# Patient Record
Sex: MALE | Employment: FULL TIME | URBAN - METROPOLITAN AREA
[De-identification: names, ages, dates, MRNs, and addresses within clinical notes are randomized per-mention and may not be internally consistent; named-entity substitution may affect disease eponyms.]

---

## 2018-07-28 ENCOUNTER — TRANSFERRED RECORDS (OUTPATIENT)
Dept: HEALTH INFORMATION MANAGEMENT | Facility: CLINIC | Age: 38
End: 2018-07-28

## 2018-08-23 ENCOUNTER — OFFICE VISIT (OUTPATIENT)
Dept: FAMILY MEDICINE | Facility: CLINIC | Age: 38
End: 2018-08-23
Payer: COMMERCIAL

## 2018-08-23 VITALS
HEART RATE: 63 BPM | BODY MASS INDEX: 26.9 KG/M2 | SYSTOLIC BLOOD PRESSURE: 113 MMHG | HEIGHT: 73 IN | WEIGHT: 203 LBS | DIASTOLIC BLOOD PRESSURE: 71 MMHG | TEMPERATURE: 98.6 F | OXYGEN SATURATION: 94 %

## 2018-08-23 DIAGNOSIS — K66.8 PERITONEAL CYST: ICD-10-CM

## 2018-08-23 DIAGNOSIS — K42.9 UMBILICAL HERNIA WITHOUT OBSTRUCTION AND WITHOUT GANGRENE: Primary | ICD-10-CM

## 2018-08-23 PROCEDURE — 99214 OFFICE O/P EST MOD 30 MIN: CPT | Performed by: INTERNAL MEDICINE

## 2018-08-23 NOTE — PROGRESS NOTES
SUBJECTIVE:   Marcos Birmingham is a 37 year old male who presents to clinic today for the following health issues:      New Patient/Transfer of Care    ED/UC Followup:    Facility:  [facility]    Date of visit: 07/28/2018  Reason for visit: Abdominal pain, right lower quadrant;Abnormal computed tomography of pelvis  Current Status: patient state that he is doing better       HPI:   Patient Marcos Birmingham is a very pleasant 37 year old male with history of umbilical hernia who presents to Internal Medicine clinic today for post ER discharge follow up after recent ER visit at [facility]. Regarding the patient's recent ER visit, the patient was diagnosed with an incidental finding of peritoneal cyst in addition to his umbilical hernia. Patient is interested in an evaluation by general surgery clinic going forward both the umbilical hernia and peritoneal cyst findings on the CT scan obtained at the ER. the patient denies any chest pain, headaches, fever or chills.        Current Medications:     No current outpatient prescriptions on file.         Allergies:      Allergies   Allergen Reactions     No Known Allergies             Past Medical History:   No past medical history on file.      Past Surgical History:   No past surgical history on file.      Family Medical History:   No family history on file.      Social History:     Social History     Social History     Marital status:      Spouse name: N/A     Number of children: N/A     Years of education: N/A     Occupational History     Not on file.     Social History Main Topics     Smoking status: Never Smoker     Smokeless tobacco: Never Used     Alcohol use Yes      Comment: 3 drinks aweek     Drug use: No     Sexual activity: Yes     Partners: Female     Other Topics Concern     Not on file     Social History Narrative     No narrative on file           Review of System:     Constitutional: Negative for fever or chills  Skin:  "Negative for rashes  Ears/Nose/Throat: Negative for nasal congestion, sore throat  Respiratory: No shortness of breath, dyspnea on exertion, cough, or hemoptysis  Cardiovascular: Negative for chest pain  Gastrointestinal: Negative for nausea, vomiting, positive for recent diagnosis of both umbilical hernia and peritoneal cyst on CT  Genitourinary: Negative for dysuria, hematuria  Musculoskeletal: Negative for myalgias  Neurologic: Negative for headaches  Psychiatric: Negative for depression, anxiety  Hematologic/Lymphatic/Immunologic: Negative  Endocrine: Negative  Behavioral: Negative for tobacco use       Physical Exam:   /71 (BP Location: Left arm, Cuff Size: Adult Large)  Pulse 63  Temp 98.6  F (37  C) (Oral)  Ht 6' 1\" (1.854 m)  Wt 203 lb (92.1 kg)  SpO2 94%  BMI 26.78 kg/m2    GENERAL: alert and no distress  EYES: eyes grossly normal to inspection, and conjunctivae and sclerae normal  HENT: Normocephalic atraumatic. Nose and mouth without ulcers or lesions  NECK: supple  RESP: lungs clear to auscultation   CV: regular rate and rhythm, normal S1 S2  LYMPH: no peripheral edema   ABDOMEN: non-distended, umbilical hernia present without signs of acute bowel obstruction  MS: no gross musculoskeletal defects noted  SKIN: no suspicious lesions or rashes  NEURO: Alert & Oriented x 3.   PSYCH: mentation appears normal, affect normal        Diagnostic Test Results:   Outside CT Abd Pelvis with IV contrast obtained on 7/28/2018 at [facility] reviewed today shows:    IMPRESSION:  1. No acute type findings confirmed.  2. 2.5 cm cyst within the left pelvis is nonspecific, may reflect peritoneal inclusion type cyst. Recommend follow-up in 6-12 months to confirm stability.  3. Small fat-containing umbilical hernia.    Significant findings requiring follow-up. Recommend follow-up CT abdomen and pelvis and 6-12 months.   Result Narrative   COMPARISON:  None.    TECHNIQUE:  Images were obtained through the " abdomen and pelvis following the administration of  100 mL IOPAMIDOL 61 % IV SOLN contrast.    FINDINGS: Lung bases clear other than some mild dependent atelectatic change. No effusion. Cholecystectomy clips. Liver, spleen, adrenal glands, pancreas, and kidneys are unremarkable. No abdominal lymphadenopathy or ascites. Scattered small mesenteric lymph nodes including a few in the right lower quadrant, nonspecific. Small fat-containing umbilical hernia.    No evidence of bowel obstruction. No bowel wall thickening or inflammatory change confirmed. No evidence of appendicitis with normal appendix noted for instance series 2 slices 69 through 72 and corresponding coronal series 5 slices 23 through 30.    No free fluid or lymphadenopathy within the pelvis. Bladder is unremarkable. Within the left mid pelvis, immediately medial to the left external iliac vein there is an ovoid 2.5 x 2.0 x 2.0 cm fluid attenuation collection (e.g. series 2 slice 67). No adjacent inflammatory change. Appears separate from the adjacent sigmoid colon.    No suspicious osseous findings.         ASSESSMENT/PLAN:       (K42.9) Umbilical hernia without obstruction and without gangrene  (primary encounter diagnosis)  Comment: chronic umbilical hernia without signs of bowel obstruction   Plan: I have ordered GENERAL SURG ADULT REFERRAL with the [facility] surgery clinic for further evaluation and management going forward.        (K66.8) Peritoneal cyst  Comment: incidental finding of peritoneal cyst on CT scan of unclear clinical significance  Plan: I have ordered GENERAL SURG ADULT REFERRAL with the [facility] surgery [facility] for further evaluation and management going forward.     Follow Up Plan:     Patient is instructed to return to Internal Medicine clinic for follow-up visit in 1 month.        Nuvia Moore MD  Internal Medicine  [facility]

## 2018-08-23 NOTE — MR AVS SNAPSHOT
After Visit Summary   8/23/2018    Marcos Birmingham    MRN: [id number]           Patient Information     Date Of Birth          1980        Visit Information        Provider Department      8/23/2018 11:40 AM Nuvia Moore MD [facility]        Today's Diagnoses     Umbilical hernia without obstruction and without gangrene    -  1    Peritoneal cyst           Follow-ups after your visit        Additional Services     GENERAL SURG ADULT REFERRAL       Your provider has referred you to: [facility]: [facility] [phone]   [email or web address]    Please be aware that coverage of these services is subject to the terms and limitations of your health insurance plan.  Call member services at your health plan with any benefit or coverage questions.      Please bring the following with you to your appointment:    (1) Any X-Rays, CTs or MRIs which have been performed.  Contact the facility where they were done to arrange for  prior to your scheduled appointment.   (2) List of current medications   (3) This referral request   (4) Any documents/labs given to you for this referral                  Your next 10 appointments already scheduled     Aug 29, 2018 10:15 AM CDT   CONSULT with Kranthi Felton MD   [facility]    [address]  [place] [address] [phone]              Who to contact     If you have questions or need follow up information about today's clinic visit or your schedule please contact [facility] directly at [phone].  Normal or non-critical lab and imaging results will be communicated to you by MyChart, letter or phone within 4 business days after the clinic has received the results. If you do not hear from us within 7 days, please contact the clinic through MyChart or phone. If you have a critical or abnormal lab result, we  "will notify you by phone as soon as possible.  Submit refill requests through [facility] or call your pharmacy and they will forward the refill request to us. Please allow 3 business days for your refill to be completed.          Additional Information About Your Visit        OrderUphart Information     [facility] lets you send messages to your doctor, view your test results, renew your prescriptions, schedule appointments and more. To sign up, go to [email or web address] . Click on \"Log in\" on the left side of the screen, which will take you to the Welcome page. Then click on \"Sign up Now\" on the right side of the page.     You will be asked to enter the access code listed below, as well as some personal information. Please follow the directions to create your username and password.     Your access code is: [email or web address]  Expires: 10/31/2018  3:21 PM     Your access code will  in 90 days. If you need help or a new code, please call your [place] clinic or [phone].        Care EveryWhere ID     This is your Care EveryWhere ID. This could be used by other organizations to access your [place] medical records  [id number]        Your Vitals Were     Pulse Temperature Height Pulse Oximetry BMI (Body Mass Index)       63 98.6  F (37  C) (Oral) 6' 1\" (1.854 m) 94% 26.78 kg/m2        Blood Pressure from Last 3 Encounters:   18 113/71    Weight from Last 3 Encounters:   18 203 lb (92.1 kg)              We Performed the Following     GENERAL SURG ADULT REFERRAL        Primary Care Provider Office Phone # Fax #    Nuvia Eliot Moore [phone] [address]  [place] [address]        Equal Access to Services     YUDITH KILLIAN : Molly Bennett, amber corona, gaston ventura, annika patrick. So [facility] [phone].    ATENCIÓN: Si habla español, tiene a stewart disposición servicios gratuitos de asistencia lingüística. Llame al " [phone].    We comply with applicable federal civil rights laws and [place] laws. We do not discriminate on the basis of race, color, national origin, age, disability, sex, sexual orientation, or gender identity.            Thank you!     Thank you for choosing [facility]  for your care. Our goal is always to provide you with excellent care. Hearing back from our patients is one way we can continue to improve our services. Please take a few minutes to complete the written survey that you may receive in the mail after your visit with us. Thank you!             Your Updated Medication List - Protect others around you: Learn how to safely use, store and throw away your medicines at www.disposemymeds.org.      Notice  As of 8/23/2018  1:12 PM    You have not been prescribed any medications.

## 2018-09-06 ENCOUNTER — OFFICE VISIT (OUTPATIENT)
Dept: SURGERY | Facility: CLINIC | Age: 38
End: 2018-09-06
Payer: COMMERCIAL

## 2018-09-06 ENCOUNTER — TELEPHONE (OUTPATIENT)
Dept: SURGERY | Facility: CLINIC | Age: 38
End: 2018-09-06

## 2018-09-06 VITALS
HEART RATE: 60 BPM | DIASTOLIC BLOOD PRESSURE: 76 MMHG | BODY MASS INDEX: 26.9 KG/M2 | WEIGHT: 203 LBS | SYSTOLIC BLOOD PRESSURE: 110 MMHG | HEIGHT: 73 IN

## 2018-09-06 DIAGNOSIS — K43.2 INCISIONAL HERNIA, WITHOUT OBSTRUCTION OR GANGRENE: Primary | ICD-10-CM

## 2018-09-06 PROCEDURE — 99204 OFFICE O/P NEW MOD 45 MIN: CPT | Performed by: SURGERY

## 2018-09-06 NOTE — LETTER
"2018    RE: Marcos Law, : 1980      [facility]  Surgery Consultation     HPI: Patient is a 38 year old male who is here for consultation requested by Nuvia Moore [phone] for evaluation of abdominal pain. The patient reports having bilateral lower abdominal pain for some time. He had a worsening episode and underwent a CT scan at [facility]. The CT showed a probable incisional hernia as well as a 2.5 cm peritoneal inclusion cyst in the left pelvis. No other abnormalities were found to account for his pain. He reports that his symptoms have continued and have worsened. He states the pain is worse with activity and relieved with rest. He has daily bowel movements and denies any complaints or changes in the pain after bowel movements. He tolerates a regular diet without any nausea. He states the umbilical hernia is getting larger and more tender. He denies any signs and symptoms of obstruction. Patient denies fevers, chills, nausea, vomiting, SOB, chest pain, abdominal pain..     PMH:   has a past medical history of Testicular torsion.  PSH:    has a past surgical history that includes Cholecystectomy and Eye surgery.  Social History:   reports that he has never smoked. He has never used smokeless tobacco. He reports that he drinks alcohol. He reports that he does not use illicit drugs.  Family History:   family history is not on file.  Medications/Allergies: Home medications and allergies reviewed.     ROS:  The 10 point Review of Systems is negative other than noted in the HPI.     Physical Exam:  /76  Pulse 60  Ht 6' 1\" (1.854 m)  Wt 203 lb (92.1 kg)  BMI 26.78 kg/m2  GENERAL: Generally appears well.  Psych: Alert and Oriented.  Normal affect  Eyes: Sclera clear  Respiratory:  Lungs clear to ausculation bilaterally with good air excursion  Cardiovascular:  Regular Rate and Rhythm with no murmurs gallops or rubs, normal peripheral pulses  GI: " Abdomen Soft mild tenderness in lower quadrants. Incisional hernia seen and partially reducible. No tenderness. No skin changes.  Groin- I examined the patient in both the standing and supine positions. Right Groin- Moderate sized inguinal hernia.  Hernia was easily reduciable. Left Groin- No hernia Palpated. No scrotal or testicle abnormalities.  Lymphatic/Hematologic/Immune:  No femoral or cervical lymphadenopathy.  Integumentary:  No rashes  Neurological: grossly intact      All new lab and imaging data was reviewed.      Impression and Plan:  Patient is a 38 year old male with abdominal pain     PLAN:   I discussed the pathophysiology of hernias and options for repair including laparoscopic VS open. I have also personally reviewed his CT that shows a incisional hernia and peritoneal inclusion cyst. He was also found to have a right inguinal hernia on examination today. I'm unsure of the etiology of his current abdominal complaints but it could be related to any of the above. The patient would like to go forward with operative intervention. I would recommend going forward with laparoscopic inguinal hernia repair, exploratory laparoscopy and peritoneal cyst removal, and open umbilical hernia with mesh. I advised him that he may not have complete symptomatic relief after surgery and that he would require further workup and management if he had ongoing issues. He understood.The risks associated with the procedure including, but not limited to, recurrence, nerve entrapment or injury, persistence of pain, injury to the bowel/bladder, infertility, hematoma, mesh migration, mesh infection, MI, and PE were discussed with the patient. He indicated understanding of the discussion, asked appropriate questions, and provided consent. Signs and symptoms of incarceration were discussed. If these develop in the interim, he promises to call or go straight to the ER. I have provided the patient with an information  pamphlet     Thank you very much for this consult.     Eliot Cobian M.D.  [facility]  [phone]

## 2018-09-06 NOTE — PROGRESS NOTES
"[facility]  Surgery Consultation    HPI: Patient is a 38 year old male who is here for consultation requested by Nuvia Moore [phone] for evaluation of abdominal pain. The patient reports having bilateral lower abdominal pain for some time. He had a worsening episode and underwent a CT scan at [facility]. The CT showed a probable incisional hernia as well as a 2.5 cm peritoneal inclusion cyst in the left pelvis. No other abnormalities were found to account for his pain. He reports that his symptoms have continued and have worsened. He states the pain is worse with activity and relieved with rest. He has daily bowel movements and denies any complaints or changes in the pain after bowel movements. He tolerates a regular diet without any nausea. He states the umbilical hernia is getting larger and more tender. He denies any signs and symptoms of obstruction. Patient denies fevers, chills, nausea, vomiting, SOB, chest pain, abdominal pain..    PMH:   has a past medical history of Testicular torsion.  PSH:    has a past surgical history that includes Cholecystectomy and Eye surgery.  Social History:   reports that he has never smoked. He has never used smokeless tobacco. He reports that he drinks alcohol. He reports that he does not use illicit drugs.  Family History:   family history is not on file.  Medications/Allergies: Home medications and allergies reviewed.    ROS:  The 10 point Review of Systems is negative other than noted in the HPI.    Physical Exam:  /76  Pulse 60  Ht 6' 1\" (1.854 m)  Wt 203 lb (92.1 kg)  BMI 26.78 kg/m2  GENERAL: Generally appears well.  Psych: Alert and Oriented.  Normal affect  Eyes: Sclera clear  Respiratory:  Lungs clear to ausculation bilaterally with good air excursion  Cardiovascular:  Regular Rate and Rhythm with no murmurs gallops or rubs, normal peripheral pulses  GI: Abdomen Soft mild tenderness in lower quadrants. Incisional hernia " seen and partially reducible. No tenderness. No skin changes.  Groin- I examined the patient in both the standing and supine positions. Right Groin- Moderate sized inguinal hernia.  Hernia was easily reduciable. Left Groin- No hernia Palpated. No scrotal or testicle abnormalities.  Lymphatic/Hematologic/Immune:  No femoral or cervical lymphadenopathy.  Integumentary:  No rashes  Neurological: grossly intact     All new lab and imaging data was reviewed.     Impression and Plan:  Patient is a 38 year old male with abdominal pain    PLAN:   I discussed the pathophysiology of hernias and options for repair including laparoscopic VS open. I have also personally reviewed his CT that shows a incisional hernia and peritoneal inclusion cyst. He was also found to have a right inguinal hernia on examination today. I'm unsure of the etiology of his current abdominal complaints but it could be related to any of the above. The patient would like to go forward with operative intervention. I would recommend going forward with laparoscopic inguinal hernia repair, exploratory laparoscopy and peritoneal cyst removal, and open umbilical hernia with mesh. I advised him that he may not have complete symptomatic relief after surgery and that he would require further workup and management if he had ongoing issues. He understood.The risks associated with the procedure including, but not limited to, recurrence, nerve entrapment or injury, persistence of pain, injury to the bowel/bladder, infertility, hematoma, mesh migration, mesh infection, MI, and PE were discussed with the patient. He indicated understanding of the discussion, asked appropriate questions, and provided consent. Signs and symptoms of incarceration were discussed. If these develop in the interim, he promises to call or go straight to the ER. I have provided the patient with an information pamphlet    Thank you very much for this consult.    Eliot Caraballo  [facility]  [phone]    Please route or send letter to:  Primary Care Provider (PCP) and Referring Provider

## 2018-09-06 NOTE — MR AVS SNAPSHOT
"              After Visit Summary   9/6/2018    Marcos Birmingham    MRN: [id number]           Patient Information     Date Of Birth          1980        Visit Information        Provider Department      9/6/2018 9:15 AM Eliot Cobian MD [facility] General Surgery      Care Instructions    Your surgery is scheduled for 9/17/18 8:15 am at [facility]          Follow-ups after your visit        Your next 10 appointments already scheduled     Sep 17, 2018   Procedure with Eliot Cobian MD   [facility] (--)    [address], [address]  [place] [address] [phone]              Who to contact     If you have questions or need follow up information about today's clinic visit or your schedule please contact [facility] directly at [phone].  Normal or non-critical lab and imaging results will be communicated to you by MyChart, letter or phone within 4 business days after the clinic has received the results. If you do not hear from us within 7 days, please contact the clinic through [facility] or phone. If you have a critical or abnormal lab result, we will notify you by phone as soon as possible.  Submit refill requests through [employer] or call your pharmacy and they will forward the refill request to us. Please allow 3 business days for your refill to be completed.          Additional Information About Your Visit        MyChart Information     [employer] lets you send messages to your doctor, view your test results, renew your prescriptions, schedule appointments and more. To sign up, go to [email or web address] . Click on \"Log in\" on the left side of the screen, which will take you to the Welcome page. Then click on \"Sign up Now\" on the right side of the page.     You will be asked to enter the access code listed below, as well as some personal information. Please follow the directions to " "create your username and password.     Your access code is: [email or web address]  Expires: 10/31/2018  3:21 PM     Your access code will  in 90 days. If you need help or a new code, please call your [place] clinic or [phone].        Care EveryWhere ID     This is your Care EveryWhere ID. This could be used by other organizations to access your [place] medical records  [id number]        Your Vitals Were     Pulse Height BMI (Body Mass Index)             60 6' 1\" (1.854 m) 26.78 kg/m2          Blood Pressure from Last 3 Encounters:   18 110/76   18 113/71    Weight from Last 3 Encounters:   18 203 lb (92.1 kg)   18 203 lb (92.1 kg)              Today, you had the following     No orders found for display       Primary Care Provider Office Phone # Fax #    Nuvia Eliot Moore [phone] [address]  [place] [facility] [address]        Equal Access to Services     [facility]: Hadii aad ku hadasho Soisauroali, waaxda luqadaha, qaybta kaalmada adealbert, annika finley . So [facility] [phone].    ATENCIÓN: Si habla español, tiene a stewart disposición servicios gratuitos de asistencia lingüística. Wilbert al [phone].    We comply with applicable federal civil rights laws and [place] laws. We do not discriminate on the basis of race, color, national origin, age, disability, sex, sexual orientation, or gender identity.            Thank you!     Thank you for choosing [facility]  for your care. Our goal is always to provide you with excellent care. Hearing back from our patients is one way we can continue to improve our services. Please take a few minutes to complete the written survey that you may receive in the mail after your visit with us. Thank you!             Your Updated Medication List - Protect others around you: Learn how to safely use, store and throw away your medicines at www.disposemymeds.org.      Notice  As of " 9/6/2018  9:28 AM    You have not been prescribed any medications.

## 2018-09-06 NOTE — TELEPHONE ENCOUNTER
Type of surgery: laparoscopic bilateral inguinal hernia repair, exploratory laparoscopy peritoneal cyst removal, umbilical hernia repair  Location of surgery: [facility]  Date and time of surgery: 09/17/18 8:15 am  Surgeon: Dr Cobian  Pre-Op Appt Date: pt to schedule  Post-Op Appt Date: pt to schedule   Packet sent out: Yes  Pre-cert/Authorization completed:  na  Date: 09/06/29    General anesthesia

## 2018-09-12 NOTE — H&P (VIEW-ONLY)
SUBJECTIVE:   Marcos Birmingham is a 37 year old male who presents to clinic today for the following health issues:      New Patient/Transfer of Care    ED/UC Followup:    Facility:  [facility]    Date of visit: 07/28/2018  Reason for visit: Abdominal pain, right lower quadrant;Abnormal computed tomography of pelvis  Current Status: patient state that he is doing better       HPI:   Patient Marcos Birmingham is a very pleasant 37 year old male with history of umbilical hernia who presents to Internal Medicine clinic today for post ER discharge follow up after recent ER visit at [facility]. Regarding the patient's recent ER visit, the patient was diagnosed with an incidental finding of peritoneal cyst in addition to his umbilical hernia. Patient is interested in an evaluation by general surgery clinic going forward both the umbilical hernia and peritoneal cyst findings on the CT scan obtained at the ER. the patient denies any chest pain, headaches, fever or chills.        Current Medications:     No current outpatient prescriptions on file.         Allergies:      Allergies   Allergen Reactions     No Known Allergies             Past Medical History:   No past medical history on file.      Past Surgical History:   No past surgical history on file.      Family Medical History:   No family history on file.      Social History:     Social History     Social History     Marital status:      Spouse name: N/A     Number of children: N/A     Years of education: N/A     Occupational History     Not on file.     Social History Main Topics     Smoking status: Never Smoker     Smokeless tobacco: Never Used     Alcohol use Yes      Comment: 3 drinks aweek     Drug use: No     Sexual activity: Yes     Partners: Female     Other Topics Concern     Not on file     Social History Narrative     No narrative on file           Review of System:     Constitutional: Negative for fever or chills  Skin:  "Negative for rashes  Ears/Nose/Throat: Negative for nasal congestion, sore throat  Respiratory: No shortness of breath, dyspnea on exertion, cough, or hemoptysis  Cardiovascular: Negative for chest pain  Gastrointestinal: Negative for nausea, vomiting, positive for recent diagnosis of both umbilical hernia and peritoneal cyst on CT  Genitourinary: Negative for dysuria, hematuria  Musculoskeletal: Negative for myalgias  Neurologic: Negative for headaches  Psychiatric: Negative for depression, anxiety  Hematologic/Lymphatic/Immunologic: Negative  Endocrine: Negative  Behavioral: Negative for tobacco use       Physical Exam:   /71 (BP Location: Left arm, Cuff Size: Adult Large)  Pulse 63  Temp 98.6  F (37  C) (Oral)  Ht 6' 1\" (1.854 m)  Wt 203 lb (92.1 kg)  SpO2 94%  BMI 26.78 kg/m2    GENERAL: alert and no distress  EYES: eyes grossly normal to inspection, and conjunctivae and sclerae normal  HENT: Normocephalic atraumatic. Nose and mouth without ulcers or lesions  NECK: supple  RESP: lungs clear to auscultation   CV: regular rate and rhythm, normal S1 S2  LYMPH: no peripheral edema   ABDOMEN: non-distended, umbilical hernia present without signs of acute bowel obstruction  MS: no gross musculoskeletal defects noted  SKIN: no suspicious lesions or rashes  NEURO: Alert & Oriented x 3.   PSYCH: mentation appears normal, affect normal        Diagnostic Test Results:   Outside CT Abd Pelvis with IV contrast obtained on 7/28/2018 at [facility] reviewed today shows:    IMPRESSION:  1. No acute type findings confirmed.  2. 2.5 cm cyst within the left pelvis is nonspecific, may reflect peritoneal inclusion type cyst. Recommend follow-up in 6-12 months to confirm stability.  3. Small fat-containing umbilical hernia.    Significant findings requiring follow-up. Recommend follow-up CT abdomen and pelvis and 6-12 months.   Result Narrative   COMPARISON:  None.    TECHNIQUE:  Images were obtained through the " abdomen and pelvis following the administration of  100 mL IOPAMIDOL 61 % IV SOLN contrast.    FINDINGS: Lung bases clear other than some mild dependent atelectatic change. No effusion. Cholecystectomy clips. Liver, spleen, adrenal glands, pancreas, and kidneys are unremarkable. No abdominal lymphadenopathy or ascites. Scattered small mesenteric lymph nodes including a few in the right lower quadrant, nonspecific. Small fat-containing umbilical hernia.    No evidence of bowel obstruction. No bowel wall thickening or inflammatory change confirmed. No evidence of appendicitis with normal appendix noted for instance series 2 slices 69 through 72 and corresponding coronal series 5 slices 23 through 30.    No free fluid or lymphadenopathy within the pelvis. Bladder is unremarkable. Within the left mid pelvis, immediately medial to the left external iliac vein there is an ovoid 2.5 x 2.0 x 2.0 cm fluid attenuation collection (e.g. series 2 slice 67). No adjacent inflammatory change. Appears separate from the adjacent sigmoid colon.    No suspicious osseous findings.         ASSESSMENT/PLAN:       (K42.9) Umbilical hernia without obstruction and without gangrene  (primary encounter diagnosis)  Comment: chronic umbilical hernia without signs of bowel obstruction   Plan: I have ordered GENERAL SURG ADULT REFERRAL with the [facility] surgery clinic for further evaluation and management going forward.        (K66.8) Peritoneal cyst  Comment: incidental finding of peritoneal cyst on CT scan of unclear clinical significance  Plan: I have ordered GENERAL SURG ADULT REFERRAL with the [facility] surgery [facility] for further evaluation and management going forward.     Follow Up Plan:     Patient is instructed to return to Internal Medicine clinic for follow-up visit in 1 month.        Nuvia Moore MD  Internal Medicine  [facility]

## 2018-09-13 ENCOUNTER — OFFICE VISIT (OUTPATIENT)
Dept: FAMILY MEDICINE | Facility: CLINIC | Age: 38
End: 2018-09-13
Payer: COMMERCIAL

## 2018-09-13 VITALS
HEIGHT: 73 IN | OXYGEN SATURATION: 96 % | TEMPERATURE: 98.3 F | DIASTOLIC BLOOD PRESSURE: 74 MMHG | HEART RATE: 56 BPM | WEIGHT: 209 LBS | BODY MASS INDEX: 27.7 KG/M2 | SYSTOLIC BLOOD PRESSURE: 114 MMHG

## 2018-09-13 DIAGNOSIS — K66.8 PERITONEAL CYST: ICD-10-CM

## 2018-09-13 DIAGNOSIS — K40.21 BILATERAL RECURRENT INGUINAL HERNIA WITHOUT OBSTRUCTION OR GANGRENE: ICD-10-CM

## 2018-09-13 DIAGNOSIS — Z01.818 PREOP GENERAL PHYSICAL EXAM: Primary | ICD-10-CM

## 2018-09-13 PROCEDURE — 93000 ELECTROCARDIOGRAM COMPLETE: CPT | Performed by: INTERNAL MEDICINE

## 2018-09-13 PROCEDURE — 99214 OFFICE O/P EST MOD 30 MIN: CPT | Performed by: INTERNAL MEDICINE

## 2018-09-13 NOTE — PROGRESS NOTES
[address] [address]  [phone]  Dept: [phone]    PRE-OP EVALUATION:  Today's date: 2018    Marcos Birmingham (: 1980) presents for pre-operative evaluation assessment as requested by Dr. Cobian.  He requires evaluation and anesthesia risk assessment prior to undergoing surgery/procedure for treatment of bilateral inguinal hernia and peritoneal cyst.    Proposed Surgery/ Procedure: Laparoscopic bilateral inguinal hernia repairs with mesh, exploratory laparoscopy for partial peritoneal cyst removal  Date of Surgery/ Procedure: 18  Time of Surgery/ Procedure: 08/15  Hospital/Surgical Facility: [facility]  Fax number for surgical facility:   Primary Physician: Nuvia Moore  Type of Anesthesia Anticipated: General    Patient has a Health Care Directive or Living Will:  NO    1. NO - Do you have a history of heart attack, stroke, stent, bypass or surgery on an artery in the head, neck, heart or legs?  2  NO - DO YOU EVER HAVE ANY PAIN OR DISCOMFORT IN YOUR CHEST?   3. NO - Do you have a history of  Heart Failure?  4. NO - Are you troubled by shortness of breath when: walking on the level, up a slight hill or at night?  5. NO - Do you currently have a cold, bronchitis or other respiratory infection?  6. NO - Do you have a cough, shortness of breath or wheezing?  7. NO - Do you sometimes get pains in the calves of your legs when you walk?  8. NO - Do you or anyone in your family have previous history of blood clots?  9. NO - Do you or does anyone in your family have a serious bleeding problem such as prolonged bleeding following surgeries or cuts?  10. NO - Have you ever had problems with anemia or been told to take iron pills?  11. NO - Have you had any abnormal blood loss such as black, tarry or bloody stools, or abnormal vaginal bleeding?  12. NO - Have you ever had a blood transfusion?  13. NO - Have you or any of your relatives ever had  "problems with anesthesia?  14. NO - Do you have sleep apnea, excessive snoring or daytime drowsiness?  15. NO - Do you have any prosthetic heart valves?  16. NO - Do you have prosthetic joints?  17. NO - Is there any chance that you may be pregnant?      HPI:     HPI related to upcoming procedure: Patient Marcos Birmingham is a very pleasant 38 year old male with inguinal hernia who presents to internal medicine clinic today for a pre op cardiac evlaution for upcoming LAPAROSCOPIC BILATERAL INGUINAL HERNIA REPAIRS WITH MESH, EXPLORATORY LAPAROSCOPY FOR PARTIAL PERITONEAL CYST REMOVAL for treatment of bilateral inguinal hernia and partial peritoneal cyst removal. Patient denies any chest pain, headaches, fever or chills. Patient does not take any daily aspirin or any other blood thinner medications. Patient denies any allergies to anesthesia agents.      MEDICAL HISTORY:   There are no active problems to display for this patient.     Past Medical History:   Diagnosis Date     Testicular torsion      Past Surgical History:   Procedure Laterality Date     CHOLECYSTECTOMY       EYE SURGERY      As child     No current outpatient prescriptions on file.     OTC products: None, except as noted above    Allergies   Allergen Reactions     No Known Allergies       Latex Allergy: NO    Social History   Substance Use Topics     Smoking status: Never Smoker     Smokeless tobacco: Never Used     Alcohol use Yes      Comment: 3 drinks aweek     History   Drug Use No       REVIEW OF SYSTEMS:   Constitutional, neuro, ENT, endocrine, pulmonary, cardiac, gastrointestinal, genitourinary, musculoskeletal, integument and psychiatric systems are negative, except as otherwise noted.    EXAM:   /74 (BP Location: Left arm, Patient Position: Sitting, Cuff Size: Adult Large)  Pulse 56  Temp 98.3  F (36.8  C) (Oral)  Ht 6' 1\" (1.854 m)  Wt 209 lb (94.8 kg)  SpO2 96%  BMI 27.57 kg/m2    GENERAL APPEARANCE: healthy, alert and no " distress     EYES: EOMI,  PERRL     HENT: ear canals and TM's normal and nose and mouth without ulcers or lesions     NECK: no adenopathy, no asymmetry, masses, or scars and thyroid normal to palpation     RESP: lungs clear to auscultation - no rales, rhonchi or wheezes     CV: regular rates and rhythm, normal S1 S2, no S3 or S4 and no murmur, click or rub     ABDOMEN:  soft, nontender, no HSM or masses and bowel sounds normal     MS: extremities normal- no gross deformities noted, no evidence of inflammation in joints, FROM in all extremities.     SKIN: no suspicious lesions or rashes     NEURO: Normal strength and tone, sensory exam grossly normal, mentation intact and speech normal     PSYCH: mentation appears normal. and affect normal/bright     LYMPHATICS: No cervical adenopathy    DIAGNOSTICS:   EKG: Sinus  Bradycardia   -  Nonspecific T-abnormality.   no acute ST/T changes c/w ischemia, no LVH by voltage criteria      Complete Blood Count-W/Diff (07/28/2018 8:28 AM)  Complete Blood Count-W/Diff (07/28/2018 8:28 AM)   Component Value Ref Range   White Blood Cell Count 4.8 3.8 - 11.0 k/cmm   Red Blood Cell Count 4.93 4.20 - 5.90 m/cmm   Hemoglobin 14.7 13.4 - 17.5 g/dL   Hematocrit 43.3 39.0 - 51.0 %   Mean Corpuscular Volume 87.8 80.0 - 100.0 fL   RDW 12.4 11.0 - 15.0 %   Platelet Count 154 140 - 450 k/cmm         IMPRESSION:   Reason for surgery/procedure: bilateral inguinal hernia and peritoneal cyst.  Diagnosis/reason for consult: pre op cardiac evlaution for upcoming LAPAROSCOPIC BILATERAL INGUINAL HERNIA REPAIRS WITH MESH, EXPLORATORY LAPAROSCOPY FOR PARTIAL PERITONEAL CYST REMOVAL for treatment of bilateral inguinal hernia and partial peritoneal cyst removal.     The proposed surgical procedure is considered INTERMEDIATE risk.    REVISED CARDIAC RISK INDEX  The patient has the following serious cardiovascular risks for perioperative complications such as (MI, PE, VFib and 3  AV Block):  No serious  cardiac risks  INTERPRETATION: 0 risks: Class I (very low risk - 0.4% complication rate)    The patient has the following additional risks for perioperative complications:  No identified additional risks      ICD-10-CM    1. Preop general physical exam Z01.818 EKG 12-lead complete w/read - Clinics       RECOMMENDATIONS:     --Patient is to take all scheduled medications on the day of surgery EXCEPT for modifications listed below.    APPROVAL GIVEN to proceed with proposed procedure, without further diagnostic evaluation       Signed Electronically by: Nuvia Moore MD    Copy of this evaluation report is provided to requesting physician.    [facility] Preop Guidelines    Revised Cardiac Risk Index

## 2018-09-17 ENCOUNTER — SURGERY (OUTPATIENT)
Age: 38
End: 2018-09-17

## 2018-09-17 ENCOUNTER — ANESTHESIA (OUTPATIENT)
Dept: SURGERY | Facility: CLINIC | Age: 38
End: 2018-09-17
Payer: COMMERCIAL

## 2018-09-17 ENCOUNTER — HOSPITAL ENCOUNTER (OUTPATIENT)
Facility: CLINIC | Age: 38
Discharge: HOME | End: 2018-09-17
Attending: SURGERY | Admitting: SURGERY
Payer: COMMERCIAL

## 2018-09-17 ENCOUNTER — APPOINTMENT (OUTPATIENT)
Dept: SURGERY | Facility: PHYSICIAN GROUP | Age: 38
End: 2018-09-17
Payer: COMMERCIAL

## 2018-09-17 ENCOUNTER — ANESTHESIA EVENT (OUTPATIENT)
Dept: SURGERY | Facility: CLINIC | Age: 38
End: 2018-09-17
Payer: COMMERCIAL

## 2018-09-17 VITALS
BODY MASS INDEX: 26.95 KG/M2 | SYSTOLIC BLOOD PRESSURE: 111 MMHG | WEIGHT: 210 LBS | OXYGEN SATURATION: 96 % | DIASTOLIC BLOOD PRESSURE: 70 MMHG | RESPIRATION RATE: 16 BRPM | TEMPERATURE: 97.2 F | HEIGHT: 74 IN

## 2018-09-17 DIAGNOSIS — G89.18 ACUTE POST-OPERATIVE PAIN: Primary | ICD-10-CM

## 2018-09-17 PROCEDURE — 25000566 ZZH SEVOFLURANE, EA 15 MIN: Performed by: SURGERY

## 2018-09-17 PROCEDURE — 88304 TISSUE EXAM BY PATHOLOGIST: CPT | Performed by: SURGERY

## 2018-09-17 PROCEDURE — 71000027 ZZH RECOVERY PHASE 2 EACH 15 MINS: Performed by: SURGERY

## 2018-09-17 PROCEDURE — 25000128 H RX IP 250 OP 636: Performed by: ANESTHESIOLOGY

## 2018-09-17 PROCEDURE — 88304 TISSUE EXAM BY PATHOLOGIST: CPT | Mod: 26 | Performed by: SURGERY

## 2018-09-17 PROCEDURE — 36000056 ZZH SURGERY LEVEL 3 1ST 30 MIN: Performed by: SURGERY

## 2018-09-17 PROCEDURE — 37000008 ZZH ANESTHESIA TECHNICAL FEE, 1ST 30 MIN: Performed by: SURGERY

## 2018-09-17 PROCEDURE — 25000128 H RX IP 250 OP 636: Performed by: SURGERY

## 2018-09-17 PROCEDURE — 25000132 ZZH RX MED GY IP 250 OP 250 PS 637: Performed by: STUDENT IN AN ORGANIZED HEALTH CARE EDUCATION/TRAINING PROGRAM

## 2018-09-17 PROCEDURE — 36000058 ZZH SURGERY LEVEL 3 EA 15 ADDTL MIN: Performed by: SURGERY

## 2018-09-17 PROCEDURE — C1781 MESH (IMPLANTABLE): HCPCS | Performed by: SURGERY

## 2018-09-17 PROCEDURE — 71000012 ZZH RECOVERY PHASE 1 LEVEL 1 FIRST HR: Performed by: SURGERY

## 2018-09-17 PROCEDURE — 40000170 ZZH STATISTIC PRE-PROCEDURE ASSESSMENT II: Performed by: SURGERY

## 2018-09-17 PROCEDURE — 25000125 ZZHC RX 250: Performed by: SURGERY

## 2018-09-17 PROCEDURE — 25000128 H RX IP 250 OP 636: Performed by: NURSE ANESTHETIST, CERTIFIED REGISTERED

## 2018-09-17 PROCEDURE — 27210794 ZZH OR GENERAL SUPPLY STERILE: Performed by: SURGERY

## 2018-09-17 PROCEDURE — 37000009 ZZH ANESTHESIA TECHNICAL FEE, EACH ADDTL 15 MIN: Performed by: SURGERY

## 2018-09-17 PROCEDURE — 25000125 ZZHC RX 250: Performed by: NURSE ANESTHETIST, CERTIFIED REGISTERED

## 2018-09-17 PROCEDURE — 49650 LAP ING HERNIA REPAIR INIT: CPT | Mod: 50 | Performed by: SURGERY

## 2018-09-17 PROCEDURE — 71000013 ZZH RECOVERY PHASE 1 LEVEL 1 EA ADDTL HR: Performed by: SURGERY

## 2018-09-17 DEVICE — MESH PROGRIP LAPAROSCOPIC 5.9X3.9" PARIETEX SELF-FIX LPG1510: Type: IMPLANTABLE DEVICE | Site: INGUINAL | Status: FUNCTIONAL

## 2018-09-17 DEVICE — MESH VENTRALEX HERNIA 2.5" CIRCLE MED W/STRAP 5950008: Type: IMPLANTABLE DEVICE | Site: UMBILICAL | Status: FUNCTIONAL

## 2018-09-17 RX ORDER — ONDANSETRON 2 MG/ML
4 INJECTION INTRAMUSCULAR; INTRAVENOUS EVERY 30 MIN PRN
Status: DISCONTINUED | OUTPATIENT
Start: 2018-09-17 | End: 2018-09-17 | Stop reason: HOSPADM

## 2018-09-17 RX ORDER — SODIUM CHLORIDE, SODIUM LACTATE, POTASSIUM CHLORIDE, CALCIUM CHLORIDE 600; 310; 30; 20 MG/100ML; MG/100ML; MG/100ML; MG/100ML
INJECTION, SOLUTION INTRAVENOUS CONTINUOUS
Status: DISCONTINUED | OUTPATIENT
Start: 2018-09-17 | End: 2018-09-17 | Stop reason: HOSPADM

## 2018-09-17 RX ORDER — CEFAZOLIN SODIUM 1 G/3ML
1 INJECTION, POWDER, FOR SOLUTION INTRAMUSCULAR; INTRAVENOUS SEE ADMIN INSTRUCTIONS
Status: DISCONTINUED | OUTPATIENT
Start: 2018-09-17 | End: 2018-09-17 | Stop reason: HOSPADM

## 2018-09-17 RX ORDER — FENTANYL CITRATE 50 UG/ML
INJECTION, SOLUTION INTRAMUSCULAR; INTRAVENOUS PRN
Status: DISCONTINUED | OUTPATIENT
Start: 2018-09-17 | End: 2018-09-17

## 2018-09-17 RX ORDER — OXYCODONE HYDROCHLORIDE 5 MG/1
5 TABLET ORAL
Status: COMPLETED | OUTPATIENT
Start: 2018-09-17 | End: 2018-09-17

## 2018-09-17 RX ORDER — PROPOFOL 10 MG/ML
INJECTION, EMULSION INTRAVENOUS PRN
Status: DISCONTINUED | OUTPATIENT
Start: 2018-09-17 | End: 2018-09-17

## 2018-09-17 RX ORDER — OXYCODONE HYDROCHLORIDE 5 MG/1
5-10 TABLET ORAL
Qty: 15 TABLET | Refills: 0 | Status: SHIPPED | OUTPATIENT
Start: 2018-09-17 | End: 2019-05-16

## 2018-09-17 RX ORDER — ONDANSETRON 2 MG/ML
INJECTION INTRAMUSCULAR; INTRAVENOUS PRN
Status: DISCONTINUED | OUTPATIENT
Start: 2018-09-17 | End: 2018-09-17

## 2018-09-17 RX ORDER — BUPIVACAINE HYDROCHLORIDE AND EPINEPHRINE 2.5; 5 MG/ML; UG/ML
INJECTION, SOLUTION EPIDURAL; INFILTRATION; INTRACAUDAL; PERINEURAL
Status: DISCONTINUED
Start: 2018-09-17 | End: 2018-09-17 | Stop reason: HOSPADM

## 2018-09-17 RX ORDER — CEFAZOLIN SODIUM 2 G/100ML
2 INJECTION, SOLUTION INTRAVENOUS
Status: COMPLETED | OUTPATIENT
Start: 2018-09-17 | End: 2018-09-17

## 2018-09-17 RX ORDER — HYDROMORPHONE HYDROCHLORIDE 1 MG/ML
.3-.5 INJECTION, SOLUTION INTRAMUSCULAR; INTRAVENOUS; SUBCUTANEOUS EVERY 10 MIN PRN
Status: DISCONTINUED | OUTPATIENT
Start: 2018-09-17 | End: 2018-09-17 | Stop reason: HOSPADM

## 2018-09-17 RX ORDER — KETOROLAC TROMETHAMINE 30 MG/ML
30 INJECTION, SOLUTION INTRAMUSCULAR; INTRAVENOUS ONCE
Status: COMPLETED | OUTPATIENT
Start: 2018-09-17 | End: 2018-09-17

## 2018-09-17 RX ORDER — LIDOCAINE HYDROCHLORIDE 10 MG/ML
INJECTION, SOLUTION EPIDURAL; INFILTRATION; INTRACAUDAL; PERINEURAL
Status: DISCONTINUED
Start: 2018-09-17 | End: 2018-09-17 | Stop reason: HOSPADM

## 2018-09-17 RX ORDER — GLYCOPYRROLATE 0.2 MG/ML
INJECTION, SOLUTION INTRAMUSCULAR; INTRAVENOUS PRN
Status: DISCONTINUED | OUTPATIENT
Start: 2018-09-17 | End: 2018-09-17

## 2018-09-17 RX ORDER — NALOXONE HYDROCHLORIDE 0.4 MG/ML
.1-.4 INJECTION, SOLUTION INTRAMUSCULAR; INTRAVENOUS; SUBCUTANEOUS
Status: DISCONTINUED | OUTPATIENT
Start: 2018-09-17 | End: 2018-09-17 | Stop reason: HOSPADM

## 2018-09-17 RX ORDER — SODIUM CHLORIDE, SODIUM LACTATE, POTASSIUM CHLORIDE, CALCIUM CHLORIDE 600; 310; 30; 20 MG/100ML; MG/100ML; MG/100ML; MG/100ML
INJECTION, SOLUTION INTRAVENOUS CONTINUOUS PRN
Status: DISCONTINUED | OUTPATIENT
Start: 2018-09-17 | End: 2018-09-17

## 2018-09-17 RX ORDER — FENTANYL CITRATE 50 UG/ML
25-50 INJECTION, SOLUTION INTRAMUSCULAR; INTRAVENOUS
Status: DISCONTINUED | OUTPATIENT
Start: 2018-09-17 | End: 2018-09-17 | Stop reason: HOSPADM

## 2018-09-17 RX ORDER — ONDANSETRON 4 MG/1
4 TABLET, ORALLY DISINTEGRATING ORAL
Status: DISCONTINUED | OUTPATIENT
Start: 2018-09-17 | End: 2018-09-17 | Stop reason: HOSPADM

## 2018-09-17 RX ORDER — ONDANSETRON 4 MG/1
4 TABLET, ORALLY DISINTEGRATING ORAL EVERY 30 MIN PRN
Status: DISCONTINUED | OUTPATIENT
Start: 2018-09-17 | End: 2018-09-17 | Stop reason: HOSPADM

## 2018-09-17 RX ORDER — ACETAMINOPHEN 325 MG/1
650 TABLET ORAL
Status: DISCONTINUED | OUTPATIENT
Start: 2018-09-17 | End: 2018-09-17 | Stop reason: HOSPADM

## 2018-09-17 RX ORDER — MEPERIDINE HYDROCHLORIDE 25 MG/ML
12.5 INJECTION INTRAMUSCULAR; INTRAVENOUS; SUBCUTANEOUS
Status: DISCONTINUED | OUTPATIENT
Start: 2018-09-17 | End: 2018-09-17 | Stop reason: HOSPADM

## 2018-09-17 RX ORDER — MAGNESIUM HYDROXIDE 1200 MG/15ML
LIQUID ORAL PRN
Status: DISCONTINUED | OUTPATIENT
Start: 2018-09-17 | End: 2018-09-17 | Stop reason: HOSPADM

## 2018-09-17 RX ORDER — LIDOCAINE HYDROCHLORIDE 20 MG/ML
INJECTION, SOLUTION INFILTRATION; PERINEURAL PRN
Status: DISCONTINUED | OUTPATIENT
Start: 2018-09-17 | End: 2018-09-17

## 2018-09-17 RX ORDER — DEXAMETHASONE SODIUM PHOSPHATE 4 MG/ML
INJECTION, SOLUTION INTRA-ARTICULAR; INTRALESIONAL; INTRAMUSCULAR; INTRAVENOUS; SOFT TISSUE PRN
Status: DISCONTINUED | OUTPATIENT
Start: 2018-09-17 | End: 2018-09-17

## 2018-09-17 RX ORDER — BUPIVACAINE HYDROCHLORIDE AND EPINEPHRINE 5; 5 MG/ML; UG/ML
INJECTION, SOLUTION EPIDURAL; INTRACAUDAL; PERINEURAL
Status: DISCONTINUED
Start: 2018-09-17 | End: 2018-09-17 | Stop reason: HOSPADM

## 2018-09-17 RX ORDER — NEOSTIGMINE METHYLSULFATE 1 MG/ML
VIAL (ML) INJECTION PRN
Status: DISCONTINUED | OUTPATIENT
Start: 2018-09-17 | End: 2018-09-17

## 2018-09-17 RX ADMIN — KETOROLAC TROMETHAMINE 30 MG: 30 INJECTION, SOLUTION INTRAMUSCULAR at 10:27

## 2018-09-17 RX ADMIN — PROPOFOL 200 MG: 10 INJECTION, EMULSION INTRAVENOUS at 08:08

## 2018-09-17 RX ADMIN — ACETAMINOPHEN 650 MG: 325 TABLET, FILM COATED ORAL at 11:12

## 2018-09-17 RX ADMIN — SODIUM CHLORIDE, POTASSIUM CHLORIDE, SODIUM LACTATE AND CALCIUM CHLORIDE: 600; 310; 30; 20 INJECTION, SOLUTION INTRAVENOUS at 08:05

## 2018-09-17 RX ADMIN — DEXAMETHASONE SODIUM PHOSPHATE 4 MG: 4 INJECTION, SOLUTION INTRA-ARTICULAR; INTRALESIONAL; INTRAMUSCULAR; INTRAVENOUS; SOFT TISSUE at 08:16

## 2018-09-17 RX ADMIN — BUPIVACAINE HYDROCHLORIDE AND EPINEPHRINE BITARTRATE 30 ML: 5; .005 INJECTION, SOLUTION EPIDURAL; INTRACAUDAL; PERINEURAL at 09:45

## 2018-09-17 RX ADMIN — MIDAZOLAM 2 MG: 1 INJECTION INTRAMUSCULAR; INTRAVENOUS at 08:06

## 2018-09-17 RX ADMIN — Medication 0.5 MG: at 11:13

## 2018-09-17 RX ADMIN — Medication 0.5 MG: at 10:38

## 2018-09-17 RX ADMIN — ROCURONIUM BROMIDE 20 MG: 10 INJECTION INTRAVENOUS at 08:21

## 2018-09-17 RX ADMIN — NEOSTIGMINE METHYLSULFATE 5 MG: 1 INJECTION, SOLUTION INTRAVENOUS at 09:45

## 2018-09-17 RX ADMIN — CEFAZOLIN SODIUM 2 G: 2 INJECTION, SOLUTION INTRAVENOUS at 08:20

## 2018-09-17 RX ADMIN — DEXMEDETOMIDINE HYDROCHLORIDE 8 MCG: 100 INJECTION, SOLUTION INTRAVENOUS at 08:28

## 2018-09-17 RX ADMIN — ROCURONIUM BROMIDE 50 MG: 10 INJECTION INTRAVENOUS at 08:08

## 2018-09-17 RX ADMIN — DEXMEDETOMIDINE HYDROCHLORIDE 8 MCG: 100 INJECTION, SOLUTION INTRAVENOUS at 08:45

## 2018-09-17 RX ADMIN — GLYCOPYRROLATE 1 MG: 0.2 INJECTION, SOLUTION INTRAMUSCULAR; INTRAVENOUS at 09:45

## 2018-09-17 RX ADMIN — OXYCODONE HYDROCHLORIDE 5 MG: 5 TABLET ORAL at 11:12

## 2018-09-17 RX ADMIN — ROCURONIUM BROMIDE 10 MG: 10 INJECTION INTRAVENOUS at 08:27

## 2018-09-17 RX ADMIN — SODIUM CHLORIDE 1000 ML: 900 IRRIGANT IRRIGATION at 08:20

## 2018-09-17 RX ADMIN — ONDANSETRON 4 MG: 2 INJECTION INTRAMUSCULAR; INTRAVENOUS at 08:16

## 2018-09-17 RX ADMIN — SODIUM CHLORIDE, POTASSIUM CHLORIDE, SODIUM LACTATE AND CALCIUM CHLORIDE: 600; 310; 30; 20 INJECTION, SOLUTION INTRAVENOUS at 09:16

## 2018-09-17 RX ADMIN — FENTANYL CITRATE 100 MCG: 50 INJECTION, SOLUTION INTRAMUSCULAR; INTRAVENOUS at 08:06

## 2018-09-17 RX ADMIN — FENTANYL CITRATE 25 MCG: 50 INJECTION, SOLUTION INTRAMUSCULAR; INTRAVENOUS at 09:54

## 2018-09-17 RX ADMIN — FENTANYL CITRATE 50 MCG: 50 INJECTION INTRAMUSCULAR; INTRAVENOUS at 10:27

## 2018-09-17 RX ADMIN — LIDOCAINE HYDROCHLORIDE 100 MG: 20 INJECTION, SOLUTION INFILTRATION; PERINEURAL at 08:08

## 2018-09-17 ASSESSMENT — LIFESTYLE VARIABLES: TOBACCO_USE: 0

## 2018-09-17 ASSESSMENT — ENCOUNTER SYMPTOMS: SEIZURES: 0

## 2018-09-17 NOTE — IP AVS SNAPSHOT
MRN:[id number]                      After Visit Summary   9/17/2018    Marcos Birmingham    MRN: [id number]           Thank you!     Thank you for choosing [facility] for your care. Our goal is always to provide you with excellent care. Hearing back from our patients is one way we can continue to improve our services. Please take a few minutes to complete the written survey that you may receive in the mail after you visit with us. Thank you!        Patient Information     Date Of Birth          1980        About your hospital stay     You were admitted on:  September 17, 2018 You last received care in the:  [facility] Same Day Surgery    You were discharged on:  September 17, 2018       Who to Call     For medical emergencies, please call 911.  For non-urgent questions about your medical care, please call your primary care provider or clinic, [phone]  For questions related to your surgery, please call your surgery clinic        Attending Provider     Provider Specialty    Eliot Cobian MD Surgery       Primary Care Provider Office Phone # Fax #    Nuvia Eliot Moore [phone]      Further instructions from your care team       Same Day Surgery Discharge Instructions for  Sedation and General Anesthesia       It's not unusual to feel dizzy, light-headed or faint for up to 24 hours after surgery or while taking pain medication.  If you have these symptoms: sit for a few minutes before standing and have someone assist you when you get up to walk or use the bathroom.      You should rest and relax for the next 24 hours. We recommend you make arrangements to have an adult stay with you for at least 24 hours after your discharge.  Avoid hazardous and strenuous activity.      DO NOT DRIVE any vehicle or operate mechanical equipment for 24 hours following the end of your surgery.  Even though you may feel normal, your reactions may be affected by the medication  you have received.      Do not drink alcoholic beverages for 24 hours following surgery.       Slowly progress to your regular diet as you feel able. It's not unusual to feel nauseated and/or vomit after receiving anesthesia.  If you develop these symptoms, drink clear liquids (apple juice, ginger ale, broth, 7-up, etc. ) until you feel better.  If your nausea and vomiting persists for 24 hours, please notify your surgeon.        All narcotic pain medications, along with inactivity and anesthesia, can cause constipation. Drinking plenty of liquids and increasing fiber intake will help.      For any questions of a medical nature, call your surgeon.      Do not make important decisions for 24 hours.      If you had general anesthesia, you may have a sore throat for a couple of days related to the breathing tube used during surgery.  You may use Cepacol lozenges to help with this discomfort.  If it worsens or if you develop a fever, contact your surgeon.       If you feel your pain is not well managed with the pain medications prescribed by your surgeon, please contact your surgeon's office to let them know so they can address your concerns.         Today you received Toradol, an antiinflammatory medication similar to Ibuprofen.  You should not take other antiinflammatory medication, such as Ibuprofen, Motrin, Advil, Aleve, Naprosyn, etc until 4:30 p.m.        Today you were given 650 mg of Tylenol at 11:15 a.m.      The recommended daily maximum dose is 4000 mg.            [facility]  Discharge Instructions: Post-Operative Laparoscopic Inguinal Hernia    ACTIVITY    Take frequent, short walks and increase your activity gradually.      Avoid strenuous physical activity or heavy lifting greater than 15lbs. for 3-4 weeks.  You may climb stairs.    You may drive without restrictions when you are not using any prescription pain medication and comfortable in a car.    You may return  to work/school when you are comfortable without any prescription pain medication.    WOUND CARE    You may remove your outer dressing or Band-Aids and shower 48 hours after the surgery.  Pat your incisions dry and leave open to air.  Re-apply dressing (Band-aid or gauze/tape) as needed for comfort or drainage.    You may have steri-strips (looks like white tape) on your incision.  You may peel off the steri-strip 2 weeks after surgery if they have not peeled off on their own.    Do not soak your incisions in a tub or pool for 2 weeks.     Do not apply any lotions, creams, or ointments to your incisions.    A ridge under incisions is normal and will gradually resolve.    DIET    Start with liquids, then gradually resume your regular diet as tolerated.     Drink plenty of liquids to stay hydrated.     PAIN    Expect some tenderness and discomfort at the incision sites.  Use the prescribed pain medication at your discretion.  Expect gradual resolution of your pain over several days.    You may take ibuprofen with food (unless you have been told not to) instead of or in addition to your prescribed pain medication.  If you are taking Norco or Percocet, do not take any additional acetaminophen/APAP/Tylenol.    Do not drink alcohol or drive while you are taking pain medications.    You may apply ice to your incisions in 20 minute intervals as needed for the next 48 hours.  After that time, consider switching to heat if you prefer.    EXPECTATIONS    *For our male patients, you may notice air in your scrotum and/or penis after the surgery.  This is due to the gas used during surgery.  You can expect some swelling and bruising involving the scrotum and/or penis as well as numbness.  These symptoms are expected and should gradually resolve in the next few days. You may use ice to help with the swelling and- try placing a rolled hand towel below your scrotum to help alleviate scrotal discomfort.  If you develop significant  testicular or penile pain, please call our office and speak with a nurse.     Pain medications can cause constipation.  Limit use when possible.  Take over the counter stool softener/stimulant, such as Colace or Senna, 1-2 times a day with plenty of water.  You may take a mild over the counter laxative, such as Miralax or a suppository, as needed.  You may discontinue these medications once you are having regular bowel movements and/or are no longer taking your narcotic pain medication.      You may have shoulder or upper back discomfort due to the gas used in surgery.  This is temporary and should resolve in 48-72 hours.  Short, frequent walks may help with this.      FOLLOW UP    Our office will contact you approximately 2 weeks to check on your progress and answer any questions you may have.  If you are doing well, you will not need to return for a follow up appointment.  If any concerns are identified over the phone, we will help you make an appointment to see a provider.    If you have not received a phone call, have any questions or concerns, or would like to be seen, please call us at [phone] and ask to speak with our nurse.  We are located at [address] [place] [address].    CALL OUR OFFICE IF YOU HAVE:     Chills or fever above 101.5 F.    Increased redness or drainage at your incisions.    Significant bleeding.    Pain not relieved by your pain medication or rest.    Increasing pain after the first 48 hours.    Any other concerns or questions.      Revised January 2018                      **If you have questions or concerns about your procedure,   call Dr. Cobian at [phone]**    Pending Results     Date and Time Order Name Status Description    9/17/2018 0914 Surgical pathology exam In process             Admission Information     Date & Time Provider Department Dept. Phone    9/17/2018 Eliot Cobian MD [facility] Same Day Surgery [phone]      Your  "Vitals Were     Blood Pressure Temperature Respirations Height Weight Pulse Oximetry    111/61 97.2  F (36.2  C) 16 1.88 m (6' 2\") 95.3 kg (210 lb) 94%    BMI (Body Mass Index)                   26.96 kg/m2           [employer] Information     [employer] lets you send messages to your doctor, view your test results, renew your prescriptions, schedule appointments and more. To sign up, go to [email or web address] . Click on \"Log in\" on the left side of the screen, which will take you to the Welcome page. Then click on \"Sign up Now\" on the right side of the page.     You will be asked to enter the access code listed below, as well as some personal information. Please follow the directions to create your username and password.     Your access code is: [email or web address]  Expires: 2018  3:08 PM     Your access code will  in 90 days. If you need help or a new code, please call your [facility] clinic or [phone].        Care EveryWhere ID     This is your Care EveryWhere ID. This could be used by other organizations to access your [facility] medical records  [id number]        Equal Access to Services     YUDITH KILLIAN AH: Molly Bennett, waluis alfredo corona, qaybta kaalmada adealbert, annika patrick. So [facility] [phone].    ATENCIÓN: Si habla español, tiene a stewart disposición servicios gratuitos de asistencia lingüística. Llame al [phone].    We comply with applicable federal civil rights laws and [place] laws. We do not discriminate on the basis of race, color, national origin, age, disability, sex, sexual orientation, or gender identity.               Review of your medicines      START taking        Dose / Directions    oxyCODONE IR 5 MG tablet   Commonly known as:  ROXICODONE   Used for:  Acute post-operative pain   Notes to Patient:  1 tablet taken at 11:12 a.m.        Dose:  5-10 mg   Take 1-2 tablets (5-10 mg) by mouth every 3 hours as needed for pain or other (Moderate " to Severe)   Quantity:  15 tablet   Refills:  0            Where to get your medicines      Some of these will need a paper prescription and others can be bought over the counter. Ask your nurse if you have questions.     Bring a paper prescription for each of these medications     oxyCODONE IR 5 MG tablet                Protect others around you: Learn how to safely use, store and throw away your medicines at www.disposemymeds.org.        Information about OPIOIDS     PRESCRIPTION OPIOIDS: WHAT YOU NEED TO KNOW   We gave you an opioid (narcotic) pain medicine. It is important to manage your pain, but opioids are not always the best choice. You should first try all the other options your care team gave you. Take this medicine for as short a time (and as few doses) as possible.    Some activities can increase your pain, such as bandage changes or therapy sessions. It may help to take your pain medicine 30 to 60 minutes before these activities. Reduce your stress by getting enough sleep, working on hobbies you enjoy and practicing relaxation or meditation. Talk to your care team about ways to manage your pain beyond prescription opioids.    These medicines have risks:    DO NOT drive when on new or higher doses of pain medicine. These medicines can affect your alertness and reaction times, and you could be arrested for driving under the influence (DUI). If you need to use opioids long-term, talk to your care team about driving.    DO NOT operate heavy machinery    DO NOT do any other dangerous activities while taking these medicines.    DO NOT drink any alcohol while taking these medicines.     If the opioid prescribed includes acetaminophen, DO NOT take with any other medicines that contain acetaminophen. Read all labels carefully. Look for the word  acetaminophen  or  Tylenol.  Ask your pharmacist if you have questions or are unsure.    You can get addicted to pain medicines, especially if you have a history of  addiction (chemical, alcohol or substance dependence). Talk to your care team about ways to reduce this risk.    All opioids tend to cause constipation. Drink plenty of water and eat foods that have a lot of fiber, such as fruits, vegetables, prune juice, apple juice and high-fiber cereal. Take a laxative (Miralax, milk of magnesia, Colace, Senna) if you don t move your bowels at least every other day. Other side effects include upset stomach, sleepiness, dizziness, throwing up, tolerance (needing more of the medicine to have the same effect), physical dependence and slowed breathing.    Store your pills in a secure place, locked if possible. We will not replace any lost or stolen medicine. If you don t finish your medicine, please throw away (dispose) as directed by your pharmacist. The [place] Pollution [employer] has more information about safe disposal: https://www.pca.[email or web address]/living-green/managing-unwanted-medications             Medication List: This is a list of all your medications and when to take them. Check marks below indicate your daily home schedule. Keep this list as a reference.      Medications           Morning Afternoon Evening Bedtime As Needed    oxyCODONE IR 5 MG tablet   Commonly known as:  ROXICODONE   Take 1-2 tablets (5-10 mg) by mouth every 3 hours as needed for pain or other (Moderate to Severe)   Last time this was given:  5 mg on 9/17/2018 11:12 AM   Notes to Patient:  1 tablet taken at 11:12 a.m.

## 2018-09-17 NOTE — LETTER
[facility]  [address]           [address]           [place] [address]            [phone]    Marcos Birmingham  [address] [place] [address]  September 19, 2018      Dear Marcos,    This letter is to inform you of the results of your pathology report from your recent procedure.   Peritoneum, cyst wall: Excision:   - Benign mesothelial cyst     This is a benign (non-cancerous) lesion that does not require any further treatment.       If you have any further questions, please do not hesitate to call: [facility] [phone].    Sincerely,  Eliot Cobian M.D.  [facility]

## 2018-09-17 NOTE — ANESTHESIA CARE TRANSFER NOTE
Patient: Marcos Birmingham    Procedure(s):  LAPAROSCOPIC BILATERAL INGUINAL HERNIA REPAIRS WITH MESH, EXPLORATORY LAPAROSCOPY FOR PERITONEAL CYST REMOVAL, UMBILICAL HERNIA REPAIR - Wound Class: I-Clean   - Wound Class: I-Clean   - Wound Class: I-Clean    Diagnosis: RIGHT INGUINAL HERNIA PERITONEAL CYST, UMBILICAL HERNIA   Diagnosis Additional Information: No value filed.    Anesthesia Type:   General     Note:  Airway :Face Mask  Patient transferred to:PACU  Comments: Neuromuscular blockade reversed after TOF 4/4, spontaneous respirations, adequate tidal volumes, followed commands to voice, oropharynx suctioned with soft flexible catheter, extubated atraumatically, extubated with suction, airway patent after extubation.  Oxygen via facemask at 10 liters per minute to PACU. Oxygen tubing connected to wall O2 in PACU, SpO2, NiBP, and EKG monitors and alarms on and functioning, Nathalia Hugger warmer connected to patient gown, report on patient's clinical status given to PACU RN, RN questions answered. Handoff Report: Identifed the Patient, Identified the Reponsible Provider, Reviewed the pertinent medical history, Discussed the surgical course, Reviewed Intra-OP anesthesia mangement and issues during anesthesia, Set expectations for post-procedure period and Allowed opportunity for questions and acknowledgement of understanding      Vitals: (Last set prior to Anesthesia Care Transfer)    CRNA VITALS  9/17/2018 0933 - 9/17/2018 1008      9/17/2018             Pulse: 86    SpO2: 100 %    Resp Rate (set): 10                Electronically Signed By: DEBBIE Hammer CRNA  September 17, 2018  10:08 AM

## 2018-09-17 NOTE — IP AVS SNAPSHOT
[facility] Same Day Surgery    [address]    [place] [address]    Phone:  [phone]    Fax:  [phone]                                       After Visit Summary   9/17/2018    Marcos Birmingham    MRN: [id number]           After Visit Summary Signature Page     I have received my discharge instructions, and my questions have been answered. I have discussed any challenges I see with this plan with the nurse or doctor.    ..........................................................................................................................................  Patient/Patient Representative Signature      ..........................................................................................................................................  Patient Representative Print Name and Relationship to Patient    ..................................................               ................................................  Date                                   Time    ..........................................................................................................................................  Reviewed by Signature/Title    ...................................................              ..............................................  Date                                               Time          22EPIC Rev 08/18

## 2018-09-17 NOTE — ANESTHESIA PREPROCEDURE EVALUATION
Procedure: Procedure(s):  LAPAROSCOPIC HERNIORRHAPHY INGUINAL BILATERAL  LAPAROSCOPY DIAGNOSTIC (GENERAL)  Preop diagnosis: RIGHT INGUINAL HERNIA PERITONEAL CYST, UMBILICAL HERNIA     Allergies   Allergen Reactions     No Known Allergies      Past Medical History:   Diagnosis Date     Testicular torsion      Past Surgical History:   Procedure Laterality Date     CHOLECYSTECTOMY       EYE SURGERY      As child     GENITOURINARY SURGERY      testicular torsion     Social History   Substance Use Topics     Smoking status: Never Smoker     Smokeless tobacco: Never Used     Alcohol use Yes      Comment: 3 drinks aweek     Prior to Admission medications    Not on File     Current Facility-Administered Medications Ordered in Epic   Medication Dose Route Frequency Last Rate Last Dose     bupivacaine 0.25 % - EPINEPHrine 1:200,000 (PF) 0.25% -1:473126 injection             bupivacaine 0.5% - EPINEPHrine 1:200,000 (PF) 0.5% -1:392650 injection             ceFAZolin (ANCEF) 1 g vial to attach to  ml bag for ADULT or 50 ml bag for PEDS  1 g Intravenous See Admin Instructions         ceFAZolin (ANCEF) intermittent infusion 2 g in 100 mL dextrose PRE-MIX  2 g Intravenous Pre-Op/Pre-procedure x 1 dose         lidocaine (PF) (XYLOCAINE) 1 % injection             lidocaine (PF) (XYLOCAINE) 1 % injection             No current [facility]-ordered outpatient prescriptions on file.       Wt Readings from Last 1 Encounters:   09/17/18 95.3 kg (210 lb)     Temp Readings from Last 1 Encounters:   09/17/18 36.4  C (97.6  F) (Oral)     BP Readings from Last 6 Encounters:   09/17/18 117/82   09/13/18 114/74   09/06/18 110/76   08/23/18 113/71     Pulse Readings from Last 4 Encounters:   09/13/18 56   09/06/18 60   08/23/18 63     Resp Readings from Last 1 Encounters:   09/17/18 16     SpO2 Readings from Last 1 Encounters:   09/17/18 97%         Anesthesia Evaluation     . Pt has had prior anesthetic.     No history of anesthetic  complications          ROS/MED HX    ENT/Pulmonary:      (-) tobacco use and asthma   Neurologic:      (-) seizures and CVA   Cardiovascular:  - neg cardiovascular ROS       METS/Exercise Tolerance:     Hematologic:         Musculoskeletal:         GI/Hepatic:        (-) GERD and liver disease   Renal/Genitourinary:      (-) renal disease   Endo:         Psychiatric:         Infectious Disease:         Malignancy:         Other:                     Physical Exam      Airway   Mallampati: I  TM distance: >3 FB  Neck ROM: full    Dental   (+) implants and caps    Cardiovascular   Rhythm and rate: regular      Pulmonary    breath sounds clear to auscultation                    Anesthesia Plan      History & Physical Review  History and physical reviewed and following examination; no interval change.    ASA Status:  1 .    NPO Status:  > 8 hours    Plan for General and ETT   PONV prophylaxis:  Ondansetron (or other 5HT-3) and Dexamethasone or Solumedrol       Postoperative Care      Consents  Anesthetic plan, risks, benefits and alternatives discussed with:  Patient..                          .

## 2018-09-17 NOTE — BRIEF OP NOTE
[facility] Brief Operative Note    Pre-operative diagnosis: RIGHT INGUINAL HERNIA PERITONEAL CYST, UMBILICAL HERNIA    Post-operative diagnosis same   Procedure: Procedure(s):  LAPAROSCOPIC BILATERAL INGUINAL HERNIA REPAIRS WITH MESH, EXPLORATORY LAPAROSCOPY FOR PERITONEAL CYST REMOVAL, UMBILICAL HERNIA REPAIR - Wound Class: I-Clean   - Wound Class: I-Clean   - Wound Class: I-Clean   Surgeon(s): Surgeon(s) and Role:     * Eliot Cobian MD - Primary     * Lauri Hightower MD - Resident - Assisting   Estimated blood loss: 10 mL    Specimens:   ID Type Source Tests Collected by Time Destination   A : Peritoneal Cyst Wall Tissue Pelvis SURGICAL PATHOLOGY EXAM Eliot Cobian MD 9/17/2018  9:12 AM       Findings: Bilateral direct and right indirect inguinal hernias.  Umbilical hernia containing fat.  Simple-appearing cyst arising from the surface of the small bowel mesentery.

## 2018-09-17 NOTE — DISCHARGE INSTRUCTIONS
Same Day Surgery Discharge Instructions for  Sedation and General Anesthesia       It's not unusual to feel dizzy, light-headed or faint for up to 24 hours after surgery or while taking pain medication.  If you have these symptoms: sit for a few minutes before standing and have someone assist you when you get up to walk or use the bathroom.      You should rest and relax for the next 24 hours. We recommend you make arrangements to have an adult stay with you for at least 24 hours after your discharge.  Avoid hazardous and strenuous activity.      DO NOT DRIVE any vehicle or operate mechanical equipment for 24 hours following the end of your surgery.  Even though you may feel normal, your reactions may be affected by the medication you have received.      Do not drink alcoholic beverages for 24 hours following surgery.       Slowly progress to your regular diet as you feel able. It's not unusual to feel nauseated and/or vomit after receiving anesthesia.  If you develop these symptoms, drink clear liquids (apple juice, ginger ale, broth, 7-up, etc. ) until you feel better.  If your nausea and vomiting persists for 24 hours, please notify your surgeon.        All narcotic pain medications, along with inactivity and anesthesia, can cause constipation. Drinking plenty of liquids and increasing fiber intake will help.      For any questions of a medical nature, call your surgeon.      Do not make important decisions for 24 hours.      If you had general anesthesia, you may have a sore throat for a couple of days related to the breathing tube used during surgery.  You may use Cepacol lozenges to help with this discomfort.  If it worsens or if you develop a fever, contact your surgeon.       If you feel your pain is not well managed with the pain medications prescribed by your surgeon, please contact your surgeon's office to let them know so they can address your concerns.         Today you received Toradol, an  antiinflammatory medication similar to Ibuprofen.  You should not take other antiinflammatory medication, such as Ibuprofen, Motrin, Advil, Aleve, Naprosyn, etc until 4:30 p.m.        Today you were given 650 mg of Tylenol at 11:15 a.m.      The recommended daily maximum dose is 4000 mg.            [facility]  Discharge Instructions: Post-Operative Laparoscopic Inguinal Hernia    ACTIVITY    Take frequent, short walks and increase your activity gradually.      Avoid strenuous physical activity or heavy lifting greater than 15lbs. for 3-4 weeks.  You may climb stairs.    You may drive without restrictions when you are not using any prescription pain medication and comfortable in a car.    You may return to work/school when you are comfortable without any prescription pain medication.    WOUND CARE    You may remove your outer dressing or Band-Aids and shower 48 hours after the surgery.  Pat your incisions dry and leave open to air.  Re-apply dressing (Band-aid or gauze/tape) as needed for comfort or drainage.    You may have steri-strips (looks like white tape) on your incision.  You may peel off the steri-strip 2 weeks after surgery if they have not peeled off on their own.    Do not soak your incisions in a tub or pool for 2 weeks.     Do not apply any lotions, creams, or ointments to your incisions.    A ridge under incisions is normal and will gradually resolve.    DIET    Start with liquids, then gradually resume your regular diet as tolerated.     Drink plenty of liquids to stay hydrated.     PAIN    Expect some tenderness and discomfort at the incision sites.  Use the prescribed pain medication at your discretion.  Expect gradual resolution of your pain over several days.    You may take ibuprofen with food (unless you have been told not to) instead of or in addition to your prescribed pain medication.  If you are taking Norco or Percocet, do not take any additional  acetaminophen/APAP/Tylenol.    Do not drink alcohol or drive while you are taking pain medications.    You may apply ice to your incisions in 20 minute intervals as needed for the next 48 hours.  After that time, consider switching to heat if you prefer.    EXPECTATIONS    *For our male patients, you may notice air in your scrotum and/or penis after the surgery.  This is due to the gas used during surgery.  You can expect some swelling and bruising involving the scrotum and/or penis as well as numbness.  These symptoms are expected and should gradually resolve in the next few days. You may use ice to help with the swelling and- try placing a rolled hand towel below your scrotum to help alleviate scrotal discomfort.  If you develop significant testicular or penile pain, please call our office and speak with a nurse.     Pain medications can cause constipation.  Limit use when possible.  Take over the counter stool softener/stimulant, such as Colace or Senna, 1-2 times a day with plenty of water.  You may take a mild over the counter laxative, such as Miralax or a suppository, as needed.  You may discontinue these medications once you are having regular bowel movements and/or are no longer taking your narcotic pain medication.      You may have shoulder or upper back discomfort due to the gas used in surgery.  This is temporary and should resolve in 48-72 hours.  Short, frequent walks may help with this.      FOLLOW UP    Our office will contact you approximately 2 weeks to check on your progress and answer any questions you may have.  If you are doing well, you will not need to return for a follow up appointment.  If any concerns are identified over the phone, we will help you make an appointment to see a provider.    If you have not received a phone call, have any questions or concerns, or would like to be seen, please call us at [phone] and ask to speak with our nurse.  We are located at [address]  [facility] [address] [place] [address].    CALL OUR OFFICE IF YOU HAVE:     Chills or fever above 101.5 F.    Increased redness or drainage at your incisions.    Significant bleeding.    Pain not relieved by your pain medication or rest.    Increasing pain after the first 48 hours.    Any other concerns or questions.      Revised January 2018                      **If you have questions or concerns about your procedure,   call Dr. Cobian at [phone]**

## 2018-09-17 NOTE — ANESTHESIA POSTPROCEDURE EVALUATION
Patient: Marcos Birmingham    Procedure(s):  LAPAROSCOPIC BILATERAL INGUINAL HERNIA REPAIRS WITH MESH, EXPLORATORY LAPAROSCOPY FOR PERITONEAL CYST REMOVAL, UMBILICAL HERNIA REPAIR - Wound Class: I-Clean   - Wound Class: I-Clean   - Wound Class: I-Clean    Diagnosis:RIGHT INGUINAL HERNIA PERITONEAL CYST, UMBILICAL HERNIA   Diagnosis Additional Information: No value filed.    Anesthesia Type:  General    Note:  Anesthesia Post Evaluation    Patient location during evaluation: PACU  Patient participation: Able to fully participate in evaluation  Level of consciousness: awake, awake and alert and responsive to verbal stimuli  Pain management: adequate  Airway patency: patent  Cardiovascular status: acceptable  Respiratory status: acceptable  Hydration status: acceptable  PONV: none     Anesthetic complications: None          Last vitals:  Vitals:    09/17/18 1115 09/17/18 1130 09/17/18 1300   BP:  106/62 111/70   Resp: 16 16 16   Temp:      SpO2:  96%          Electronically Signed By: Kristy Taylor  September 17, 2018  2:00 PM

## 2018-09-17 NOTE — OP NOTE
General Surgery Operative Note    PREOPERATIVE DIAGNOSIS:  Bilateral inguinal hernia           Umbilical hernia                      Mesenteric cyst                           POSTOPERATIVE DIAGNOSIS: Bilateral inguinal hernia            Umbilical hernia                       Mesenteric cyst    PROCEDURE:  Laparoscopic bilateral inguinal hernia repair with mesh                             Exploratory laparoscopy       Laparoscopic excision of mesenteric cyst                  Umbilical Hernia repair with mesh    ANESTHESIA:  General.    SURGEON:  Eliot Cobian M.D.    ASSISTANT:   [facility] [place]    ESTIMATED BLOOD LOSS:  10 cc    INTRAOPERATIVE FINDINGS:  Large right direct and indirect hernia, 1.5 cm umbilical hernia, and 4 cm mesenteric cyst    OPERATIVE INDICATIONS: Mr. Birmingham has a symptomatic bilateral inguinal hernias. Options were discussed and it was elected to proceed with laparoscopic repair. Potential risks of bleeding, infection, neurovascular injury to the vas deferens or testicle, recurrent hernia, chronic pain were all reviewed in detail and he wished to proceed.     DESCRIPTION OF PROCEDURE: The patient was taken to the operating suite and uneventfully endotracheally intubated. The abdomen and groin were prepped and draped in the usual sterile fashion. Surgeon initiated timeout was performed verifying the correct patient, procedure, site, and surgeon. All in the room were in agreement. A mixture of lidocaine and marcaine was injected in the infra umbilical area.  A curvilinear incision was made at the underside of the umbilicus. Bovie cautery was used to get through the subcutaneous tissue. The anterior rectus sheath was encountered and sharply incised. The rectus muscle was retracted laterally and the dissecting balloon was passed along the posterior rectus sheath toward the pubic bone. The balloon was filled with air with the hand pump under direct visualization. The preperitoneal  space was dissected nicely and the baloon held in place for 30 seconds for hemostasis. The dissecting balloon was then removed and our working balloon was placed and positioned. Local was injected and Two 5 mm trocars were then placed along the lower midline under direct laparoscopic visualization. We began our dissection on the right side. Using combination of sharp and blunt dissection, a plane was created behind the abdominal wall and the underlying peritoneum. This was done in a blunt and atraumatic fashion. The inferior epigastric vessels were visualized running along the underside of the abdominal wall.  The epigastric vessels were followed down until we were able to identify the internal ring. The spermatic cord was then meticulously dissected preserving all of the nerve and blood vessels. A  large direct and indirect hernia was found and reduced without difficulty. Coopers ligament was then cleared without significant bleeding. The dissection was continued until we had an excellent space in the preperitoneal area. One holes in the peritenoneum were completely closed with a 0 endo loop.  A piece of progrip was then placed within this space. Due to the large size of the direct hernia an additional piece of progrip was placed to assure complete coverage. Attention was then turned to the opposite side.  The spermatic cord was meticulously dissected preserving all of the nerve and blood vessels. A  small direct hernia was found and reduced without difficulty. Coopers ligament was then cleared without significant bleeding. The dissection was continued until we had an excellent space in the preperitoneal area.   A piece of progrip was then placed within this space.The mesh was held in excellent position under direct visualization until the insufflation was completely out of the preperitoneal space. All trocars were removed under direct visualization. The anterior fascia was closed with an 0 Vicryl in the figure of  8 fashion. Attention was then turned to the umbilical hernia. It was sharply dissected and reduced without issues. A 1.5 cm hernia was found. The trocar was placed though the hernia to go intraabdominal. The abdomen was insufflated and the patient tolerated it without issues. The 2 5 trocars were placed uteroabdominally. The abdomen was explored and a mesenteric cyst was found in the mid small bowel. It measured 4 cm in nature. It appeared to be a simple cyst without any other findings. It was removed with cautery. The cyst wall was sent to pathology. No other findings were observed. All trocars were taken out under direct visualization. A piece of medium bard mesh was placed and anchored with an 0 vicryl. The fascia was closed with an 0 Ethibond suture. The stalk was tacked down with a 3.0 vicryl. Marcaine was once again injected to the brent-wound area. The incisions were completely dry without any bleeding. The skin was closed with a 4.0 Monocryl in a subcuticular fashion. Steri-Strips were applied. All Needle and sponge counts were correct.  A debriefing was performed verifying the correct procedure, sponge/instrument count, specimen indetification, and blood loss. The patient tolerated the procedure well and was taken to the recovery room in stable condition. The physician assistant was medically necessary to assist in prepping, positioning, camera operation, retraction/exposure, and closure of the port sites.       Eliot Cobian M.D.    Please cc note to referring provider and PCP

## 2018-09-18 LAB — COPATH REPORT: NORMAL

## 2018-09-19 ENCOUNTER — TELEPHONE (OUTPATIENT)
Dept: SURGERY | Facility: CLINIC | Age: 38
End: 2018-09-19

## 2018-09-19 NOTE — TELEPHONE ENCOUNTER
Patient had laparoscopic bilateral inguinal hernia reparis with mesh, exploratory laparoscopy for peritoneal cyst removal, and umbilical hernia repair on 9/17/18 with Dr. Cobian.    Calling today wanting to discuss his pain.  He reports that he has not had a bowel movement since before the surgery and feels that this is causing an increase in his pain.  He has been using senna, 3 a day and has used one dose of miralax.  He is no longer taking the narcotic pain medication.  He is eating and denies issue with urination.    He also reports that he has not passed flatus.    Encouraged him to take a 4th senna tablet and to try some milk of magnesia/prune juice.  Advised patient to call Friday if symptoms have not begun to improve, especially if he is still not passing gas.    He agreed and will call PRN.    Meena Najera RN

## 2018-09-19 NOTE — TELEPHONE ENCOUNTER
Name of caller: Patient    Reason for Call:  Wants to discuss the level of pain he has    Surgeon:  Dr. Cobian    Recent Surgery:  Yes.    If yes, when & what type:  9/17/18 hernia      Best phone number to reach pt at is: [phone]  Ok to leave a message with medical info? Yes.    Pharmacy preferred (if calling for a refill):

## 2018-10-08 ENCOUNTER — TELEPHONE (OUTPATIENT)
Dept: OTHER | Facility: CLINIC | Age: 38
End: 2018-10-08

## 2018-10-28 NOTE — OR NURSING
Patient up to bathroom. States he was able to void. Discharged to home.   Progress Note Camilla Plascencia 1983, 28 y o  female MRN: [id number]    Unit/Bed#: 401-01 Encounter: [id number]    Primary Care Provider: Carolina Montoya DO   Date and time admitted to hospital: 10/27/2018 12:05 PM        * Sepsis [facility])   Assessment & Plan    · The sepsis was present on admission and secondary to community-acquired pneumonia  · Check blood cultures x 2 sets  · Check a urine culture  · The lactic acid level is within normal limits  Results for Elsy Olivia (MRN [id number]) as of 10/28/2018 08:40   Ref  Range 10/27/2018 15:27   Procalcitonin Latest Ref Range: <=0 25 ng/ml 2 87 (H)     · Follow the procalcitonin level  · Continue normal saline IV fluids  · Check influenza/RSV PCR testing  · Continue IV ceftriaxone and IV azithromycin to treat community-acquired pneumonia pathogens     Community acquired pneumonia   Assessment & Plan    · Check influenza/RSV testing  · Check blood cultures x 2 sets  Results for Elsy Olivia (MRN [id number]) as of 10/28/2018 08:40   Ref   Range 10/27/2018 15:27   Procalcitonin Latest Ref Range: <=0 25 ng/ml 2 87 (H)     · Follow the procalcitonin level  · Check a sputum culture  · Continue IV ceftriaxone and IV azithromycin to treat the community-acquired pneumonia  · Continue the respiratory protocol and airway clearance protocol     Elevated LDH   Assessment & Plan    · In the setting of lymphadenopathy  · Continue to monitor     Abnormal CT scan, chest   Assessment & Plan    · Consult Pulmonology     Acute respiratory failure with hypoxia (HCC)   Assessment & Plan    · Supplemental oxygen to maintain oxygen saturation levels at 92% and above  · Respiratory protocol and airway clearance protocol  · Initiate continuous pulse oximetry     Lymphadenopathy   Assessment & Plan    · Subcarinal lymphadenopathy found on CT scan of the chest  · Likely reactive in nature  · She will need outpatient surveillance imaging to ensure resolution of the abnormal findings found on CT scan of the chest  Results for Jessie Morgan (MRN [id number]) as of 10/28/2018 08:40   Ref  Range 10/28/2018 04:41   LD (LDH) Latest Ref Range: 81 - 234 U/L 309 (H)     · Consult Pulmonology     Tobacco abuse   Assessment & Plan    · Nicotine patch  · Smoking cessation counseling         VTE Pharmacologic Prophylaxis:   Pharmacologic: Enoxaparin (Lovenox)  Mechanical VTE Prophylaxis in Place: Yes    Patient Centered Rounds: I have performed bedside rounds with nursing staff today  Time Spent for Care: 30 minutes  More than 50% of total time spent on counseling and coordination of care as described above  Current Length of Stay: 0 day(s)    Current Patient Status: Inpatient   Certification Statement: The patient, admitted on an observation basis, will now require > 2 midnight hospital stay due to the development of acute respiratory failure requiring supplemental oxygen to maintain adequate oxygen saturation levels  The patient also continues to require IV antibiotics to treat the sepsis secondary to community-acquired pneumonia in the setting of an elevated procalcitonin level  The patient will be changed to an INPATIENT ADMISSION status  Code Status: Level 1 - Full Code      Subjective: The patient was seen and examined  The patient continues to experience shortness of breath as well as a non-productive cough  In addition, she complains of generalized weakness and myalgias  Objective:     Vitals:   Temp (24hrs), Av 5 °F (36 9 °C), Min:97 6 °F (36 4 °C), Max:99 1 °F (37 3 °C)    Temp:  [97 6 °F (36 4 °C)-99 1 °F (37 3 °C)] 97 6 °F (36 4 °C)  HR:  [] 89  Resp:  [18-22] 18  BP: (106-134)/(52-89) 111/70  SpO2:  [90 %-99 %] 99 %  Body mass index is 37 43 kg/m²  Input and Output Summary (last 24 hours):        Intake/Output Summary (Last 24 hours) at 10/28/18 0845  Last data filed at 10/28/18 0832   Gross per 24 hour   Intake          3762 08 ml   Output                0 ml   Net          3762 08 ml       Physical Exam:     Physical Exam  General:  NAD, awake, alert, oriented x 3, conversational dyspnea is present  HEENT:  NC/AT, mucous membranes moist  Neck:  Supple, No JVP elevation  CV:  + S1, + S2, RRR  Pulm:  Bibasilar crackles  Abd:  Soft, Non-tender, Non-distended  Ext:  No clubbing/cyanosis/edema  Skin:  No rashes      Additional Data:     Labs:      Results from last 7 days  Lab Units 10/28/18  0441   WBC Thousand/uL 11 64*   HEMOGLOBIN g/dL 11 5   HEMATOCRIT % 35 7   PLATELETS Thousands/uL 217   NEUTROS PCT % 70   LYMPHS PCT % 22   MONOS PCT % 7   EOS PCT % 1       Results from last 7 days  Lab Units 10/28/18  0441   SODIUM mmol/L 141   POTASSIUM mmol/L 4 0   CHLORIDE mmol/L 106   CO2 mmol/L 28   BUN mg/dL 5   CREATININE mg/dL 0 64   ANION GAP mmol/L 7   CALCIUM mg/dL 8 0*   ALBUMIN g/dL 2 9*   TOTAL BILIRUBIN mg/dL 0 30   ALK PHOS U/L 76   ALT U/L 22   AST U/L 21       Results from last 7 days  Lab Units 10/27/18  1231   INR  1 11               Results from last 7 days  Lab Units 10/27/18  1527   LACTIC ACID mmol/L 1 2   PROCALCITONIN ng/ml 2 87*           * I Have Reviewed All Lab Data Listed Above  * Additional Pertinent Lab Tests Reviewed:  [facility] 66 Admission Reviewed        Recent Cultures (last 7 days):       Results from last 7 days  Lab Units 10/27/18  1534   LEGIONELLA URINARY ANTIGEN  Negative       Last 24 Hours Medication List:     Current Facility-Administered Medications:  acetaminophen 650 mg Oral Q6H PRN Zorita Hoguet, DO    albuterol 2 5 mg Nebulization Q6H PRN Zorita Hoguet, DO    azithromycin 500 mg Intravenous Q24H Zorita Hoguet, DO    cefTRIAXone 1,000 mg Intravenous Q24H Zorita Hoguet, DO    enoxaparin 40 mg Subcutaneous Q24H Zorita Hoguet, DO    gabapentin 300 mg Oral TID Zorita Hoguet, DO    levalbuterol 1 25 mg Nebulization TID Zorita Hoguet, DO    nicotine 1 patch Transdermal Daily Perla Troncoso Lovely,     ondansetron 4 mg Intravenous Q4H PRN Paris Belcher,     oxyCODONE 10 mg Oral Q4H PRN Paris Belcher, DO    oxyCODONE 5 mg Oral Q4H PRN Paris Belcher,     sodium chloride 125 mL/hr Intravenous Continuous Paris Belcher DO Last Rate: Stopped (10/28/18 0715)   sodium chloride 3 mL Nebulization TID Paris Belcher DO         Today, Patient Was Seen By: Paris Belcher DO    ** Please Note: Dictation voice to text software may have been used in the creation of this document   **

## 2019-05-16 ENCOUNTER — OFFICE VISIT (OUTPATIENT)
Dept: FAMILY MEDICINE | Facility: CLINIC | Age: 39
End: 2019-05-16
Payer: COMMERCIAL

## 2019-05-16 VITALS
DIASTOLIC BLOOD PRESSURE: 72 MMHG | TEMPERATURE: 97.2 F | WEIGHT: 204 LBS | BODY MASS INDEX: 27.04 KG/M2 | HEIGHT: 73 IN | OXYGEN SATURATION: 96 % | SYSTOLIC BLOOD PRESSURE: 110 MMHG | HEART RATE: 54 BPM

## 2019-05-16 DIAGNOSIS — B07.0 PLANTAR WARTS: ICD-10-CM

## 2019-05-16 DIAGNOSIS — Z13.220 LIPID SCREENING: ICD-10-CM

## 2019-05-16 DIAGNOSIS — Z13.1 SCREENING FOR DIABETES MELLITUS: ICD-10-CM

## 2019-05-16 DIAGNOSIS — Z00.00 ROUTINE HISTORY AND PHYSICAL EXAMINATION OF ADULT: Primary | ICD-10-CM

## 2019-05-16 LAB
ANION GAP SERPL CALCULATED.3IONS-SCNC: 3 MMOL/L (ref 3–14)
BASOPHILS # BLD AUTO: 0 10E9/L (ref 0–0.2)
BASOPHILS NFR BLD AUTO: 0.4 %
BUN SERPL-MCNC: 18 MG/DL (ref 7–30)
CALCIUM SERPL-MCNC: 9.2 MG/DL (ref 8.5–10.1)
CHLORIDE SERPL-SCNC: 106 MMOL/L (ref 94–109)
CHOLEST SERPL-MCNC: 214 MG/DL
CO2 SERPL-SCNC: 29 MMOL/L (ref 20–32)
CREAT SERPL-MCNC: 1.17 MG/DL (ref 0.66–1.25)
DIFFERENTIAL METHOD BLD: NORMAL
EOSINOPHIL # BLD AUTO: 0.3 10E9/L (ref 0–0.7)
EOSINOPHIL NFR BLD AUTO: 5.1 %
ERYTHROCYTE [DISTWIDTH] IN BLOOD BY AUTOMATED COUNT: 12.8 % (ref 10–15)
GFR SERPL CREATININE-BSD FRML MDRD: 78 ML/MIN/{1.73_M2}
GLUCOSE SERPL-MCNC: 94 MG/DL (ref 70–99)
HBA1C MFR BLD: 5.3 % (ref 0–5.6)
HCT VFR BLD AUTO: 44.2 % (ref 40–53)
HDLC SERPL-MCNC: 53 MG/DL
HGB BLD-MCNC: 15.1 G/DL (ref 13.3–17.7)
LDLC SERPL CALC-MCNC: 142 MG/DL
LYMPHOCYTES # BLD AUTO: 2.2 10E9/L (ref 0.8–5.3)
LYMPHOCYTES NFR BLD AUTO: 42.2 %
MCH RBC QN AUTO: 30 PG (ref 26.5–33)
MCHC RBC AUTO-ENTMCNC: 34.2 G/DL (ref 31.5–36.5)
MCV RBC AUTO: 88 FL (ref 78–100)
MONOCYTES # BLD AUTO: 0.4 10E9/L (ref 0–1.3)
MONOCYTES NFR BLD AUTO: 8.1 %
NEUTROPHILS # BLD AUTO: 2.3 10E9/L (ref 1.6–8.3)
NEUTROPHILS NFR BLD AUTO: 44.2 %
NONHDLC SERPL-MCNC: 161 MG/DL
PLATELET # BLD AUTO: 156 10E9/L (ref 150–450)
POTASSIUM SERPL-SCNC: 4.7 MMOL/L (ref 3.4–5.3)
RBC # BLD AUTO: 5.04 10E12/L (ref 4.4–5.9)
SODIUM SERPL-SCNC: 138 MMOL/L (ref 133–144)
TRIGL SERPL-MCNC: 97 MG/DL
WBC # BLD AUTO: 5.1 10E9/L (ref 4–11)

## 2019-05-16 PROCEDURE — 99395 PREV VISIT EST AGE 18-39: CPT | Performed by: INTERNAL MEDICINE

## 2019-05-16 PROCEDURE — 85025 COMPLETE CBC W/AUTO DIFF WBC: CPT | Performed by: INTERNAL MEDICINE

## 2019-05-16 PROCEDURE — 83036 HEMOGLOBIN GLYCOSYLATED A1C: CPT | Performed by: INTERNAL MEDICINE

## 2019-05-16 PROCEDURE — 36415 COLL VENOUS BLD VENIPUNCTURE: CPT | Performed by: INTERNAL MEDICINE

## 2019-05-16 PROCEDURE — 80048 BASIC METABOLIC PNL TOTAL CA: CPT | Performed by: INTERNAL MEDICINE

## 2019-05-16 PROCEDURE — 17110 DESTRUCTION B9 LES UP TO 14: CPT | Performed by: INTERNAL MEDICINE

## 2019-05-16 PROCEDURE — 80061 LIPID PANEL: CPT | Performed by: INTERNAL MEDICINE

## 2019-05-16 RX ORDER — IMIQUIMOD 12.5 MG/.25G
CREAM TOPICAL
Qty: 12 PACKET | Refills: 3 | Status: SHIPPED | OUTPATIENT
Start: 2019-05-16 | End: 2019-05-16

## 2019-05-16 RX ORDER — IMIQUIMOD 12.5 MG/.25G
CREAM TOPICAL
Qty: 12 PACKET | Refills: 3 | Status: SHIPPED | OUTPATIENT
Start: 2019-05-16 | End: 2020-07-25

## 2019-05-16 ASSESSMENT — MIFFLIN-ST. JEOR: SCORE: 1899.22

## 2019-05-16 NOTE — PROGRESS NOTES
SUBJECTIVE:   CC: Marcos Birmingham is an 38 year old male who presents for preventative health visit.     Healthy Habits:     Getting at least 3 servings of Calcium per day:  Yes    Bi-annual eye exam:  Yes    Dental care twice a year:  Yes    Sleep apnea or symptoms of sleep apnea:  None    Diet:  Regular (no restrictions)    Frequency of exercise:  4-5 days/week    Duration of exercise:  45-60 minutes    Barriers to taking medications:  None    Medication side effects:  Not applicable    PHQ-2 Total Score: 0    Additional concerns today:  No      Today's PHQ-2 Score:   PHQ-2 ( 1999 [employer]) 5/13/2019   Q1: Little interest or pleasure in doing things 0   Q2: Feeling down, depressed or hopeless 0   PHQ-2 Score 0   Q1: Little interest or pleasure in doing things Not at all   Q2: Feeling down, depressed or hopeless Not at all   PHQ-2 Score 0       Abuse: Current or Past(Physical, Sexual or Emotional)- No  Do you feel safe in your environment? Yes    Social History     Tobacco Use     Smoking status: Never Smoker     Smokeless tobacco: Never Used   Substance Use Topics     Alcohol use: Yes     Comment: 3 drinks aweek     If you drink alcohol do you typically have >3 drinks per day or >7 drinks per week? No    Alcohol Use 5/13/2019   Prescreen: >3 drinks/day or >7 drinks/week? No     Last PSA: No results found for: PSA    Reviewed orders with patient. Reviewed health maintenance and updated orders accordingly -   Lab work is in process    Reviewed and updated as needed this visit by clinical staff         Reviewed and updated as needed this visit by Provider        Past Medical History:   Diagnosis Date     Testicular torsion         Review of Systems  CONSTITUTIONAL: NEGATIVE for fever, chills, change in weight  INTEGUMENTARY/SKIN: POSITIVE for chronic right middle toe plantar wart   EYES: NEGATIVE for vision changes or irritation  ENT: NEGATIVE for ear, mouth and throat problems  RESP: NEGATIVE for significant cough  "or SOB  CV: NEGATIVE for chest pain, palpitations or peripheral edema  GI: NEGATIVE for nausea, abdominal pain, heartburn, or change in bowel habits   male: negative for dysuria, hematuria, decreased urinary stream, erectile dysfunction, urethral discharge  MUSCULOSKELETAL: NEGATIVE for significant arthralgias or myalgia  NEURO: NEGATIVE for weakness, dizziness or paresthesias  PSYCHIATRIC: NEGATIVE for changes in mood or affect    OBJECTIVE:   /72 (BP Location: Right arm, Patient Position: Chair, Cuff Size: Adult Regular)   Pulse 54   Temp 97.2  F (36.2  C) (Tympanic)   Ht 1.854 m (6' 1\")   Wt 92.5 kg (204 lb)   SpO2 96%   BMI 26.91 kg/m      Physical Exam  GENERAL:  alert and no distress  EYES: Eyes grossly normal to inspection, PERRL and conjunctivae and sclerae normal  HENT: ear canals and TM's normal, nose and mouth without ulcers or lesions  NECK: no adenopathy, no asymmetry, masses, or scars and thyroid normal to palpation  RESP: lungs clear to auscultation - no rales, rhonchi or wheezes  CV: regular rate and rhythm, normal S1 S2, no S3 or S4, no murmur, click or rub, no peripheral edema and peripheral pulses strong  ABDOMEN: soft, nontender, no hepatosplenomegaly, no masses and bowel sounds normal  MS: no gross musculoskeletal defects noted, no edema  SKIN: right middle toe plantar wart present, which I treated with freeze/thaw times 3 with the cryotherapy/liquid nitrogen  NEURO: Normal strength and tone, mentation intact and speech normal  PSYCH: mentation appears normal, affect normal/bright    Diagnostic Test Results:  Results for orders placed or performed during the hospital encounter of 09/17/18   Surgical pathology exam   Result Value Ref Range    Copath Report       Patient Name: BECK CHEN  MR#: [id number]  Specimen #: [id number]  Collected: 9/17/2018  Received: 9/17/2018  Reported: 9/18/2018 11:57  Ordering Phy(s): FAITH TORRES    For improved result formatting, select " "'View Enhanced Report Format' under   Linked Documents section.    SPECIMEN(S):  Peritoneal cyst wall    FINAL DIAGNOSIS:  Peritoneum, cyst wall: Excision:  - Benign mesothelial cyst    Electronically signed out by:    Grayson Lara M.D.    CLINICAL HISTORY:  38-year-old man with right inguinal hernia and peritoneal cyst    GROSS:  The specimen is received in formalin, labeled with the patient's name and   date of birth, and designated  \"peritoneal cyst wall\". It consists of two yellow-pink adipose tissue   fragments measuring 1.5 x 1.0 x 0.5 cm  and 1.0 x 0.2 x 0.1 cm.  Entirely submitted in one cassette. (Dictated by:   VIKASH Salamanca([facility]) 9/17/2018  10:42 AM)    MICROSCOPIC:  Sections of the peritoneal cyst wall show benign adipose tissue with cyst   wall com posed of a single layer of  benign appearing mesothelial cells and fibrous tissue.  There is no   evidence of malignancy.    This case was shared in intradepartmental consultation with agreement to   the above diagnosis.    CPT Codes:  A: 96472-NU9    TESTING LAB LOCATION:  [address]  [address]  [phone]    COLLECTION SITE:  Client: [facility]  Location: [facility] (S)         ASSESSMENT/PLAN:   (Z00.00) Routine history and physical examination of adult  (primary encounter diagnosis)  Comment: yearly physical exam today  Plan: CBC with platelets and differential, Basic         metabolic panel  (Ca, Cl, CO2, Creat, Gluc, K,         Na, BUN)      (B07.0) Plantar warts  Comment: right middle toe plantar wart present, which I treated with freeze/thaw times 3 with the cryotherapy/liquid nitrogen  Plan: SKIN CARE REFERRAL, PODIATRY/FOOT & ANKLE         SURGERY REFERRAL, imiquimod (ALDARA) 5 %         external cream, DESTRUCT BENIGN LESION, UP TO 14      (Z13.220) Lipid screening  Comment: patient is due for lipid screening  Plan: Lipid panel reflex to direct LDL Fasting      (Z13.1) " "Screening for diabetes mellitus  Comment: patient is due for diabetes screening  Plan: Hemoglobin A1c      COUNSELING:   Reviewed preventive health counseling, as reflected in patient instructions  Special attention given to:        Regular exercise       Healthy diet/nutrition    Estimated body mass index is 26.96 kg/m  as calculated from the following:    Height as of 9/17/18: 1.88 m (6' 2\").    Weight as of 9/17/18: 95.3 kg (210 lb).          reports that he has never smoked. He has never used smokeless tobacco.      Counseling Resources:  ATP IV Guidelines  Pooled Cohorts Equation Calculator  FRAX Risk Assessment  ICSI Preventive Guidelines  Dietary Guidelines for Americans, 2010  USDA's MyPlate  ASA Prophylaxis  Lung CA Screening    Nuvia Moore MD  [facility]  "

## 2019-05-16 NOTE — LETTER
[address] [facility]  [address]  [place]  [address]  Tel: [phone]    May 16, 2019    Marcos Birmingham  [address] [place] [address]        Dear Mr. Birmingham,    The results of your recent labs are Ok except for mild hyperlipidemia, advise diet and exercise. No change in medications. Recheck labs in 1 year.  If you have any further questions or problems, please contact our office.      Sincerely,    Eliot Moore MD/ANGEL          Enclosure: Lab Results  Results for orders placed or performed in visit on 05/16/19   Lipid panel reflex to direct LDL Fasting   Result Value Ref Range    Cholesterol 214 (H) <200 mg/dL    Triglycerides 97 <150 mg/dL    HDL Cholesterol 53 >39 mg/dL    LDL Cholesterol Calculated 142 (H) <100 mg/dL    Non HDL Cholesterol 161 (H) <130 mg/dL   CBC with platelets and differential   Result Value Ref Range    WBC 5.1 4.0 - 11.0 10e9/L    RBC Count 5.04 4.4 - 5.9 10e12/L    Hemoglobin 15.1 13.3 - 17.7 g/dL    Hematocrit 44.2 40.0 - 53.0 %    MCV 88 78 - 100 fl    MCH 30.0 26.5 - 33.0 pg    MCHC 34.2 31.5 - 36.5 g/dL    RDW 12.8 10.0 - 15.0 %    Platelet Count 156 150 - 450 10e9/L    % Neutrophils 44.2 %    % Lymphocytes 42.2 %    % Monocytes 8.1 %    % Eosinophils 5.1 %    % Basophils 0.4 %    Absolute Neutrophil 2.3 1.6 - 8.3 10e9/L    Absolute Lymphocytes 2.2 0.8 - 5.3 10e9/L    Absolute Monocytes 0.4 0.0 - 1.3 10e9/L    Absolute Eosinophils 0.3 0.0 - 0.7 10e9/L    Absolute Basophils 0.0 0.0 - 0.2 10e9/L    Diff Method Automated Method    Basic metabolic panel  (Ca, Cl, CO2, Creat, Gluc, K, Na, BUN)   Result Value Ref Range    Sodium 138 133 - 144 mmol/L    Potassium 4.7 3.4 - 5.3 mmol/L    Chloride 106 94 - 109 mmol/L    Carbon Dioxide 29 20 - 32 mmol/L    Anion Gap 3 3 - 14 mmol/L    Glucose 94 70 - 99 mg/dL    Urea Nitrogen 18 7 - 30 mg/dL    Creatinine 1.17 0.66 - 1.25 mg/dL    GFR Estimate 78 >60 mL/min/[1.73_m2]    GFR Estimate If Black >90 >60  mL/min/[1.73_m2]    Calcium 9.2 8.5 - 10.1 mg/dL   Hemoglobin A1c   Result Value Ref Range    Hemoglobin A1C 5.3 0 - 5.6 %

## 2019-12-09 ENCOUNTER — HEALTH MAINTENANCE LETTER (OUTPATIENT)
Age: 39
End: 2019-12-09

## 2020-07-22 ENCOUNTER — OFFICE VISIT (OUTPATIENT)
Dept: FAMILY MEDICINE | Facility: CLINIC | Age: 40
End: 2020-07-22
Payer: COMMERCIAL

## 2020-07-22 ENCOUNTER — OFFICE VISIT (OUTPATIENT)
Dept: ORTHOPEDICS | Facility: CLINIC | Age: 40
End: 2020-07-22
Payer: COMMERCIAL

## 2020-07-22 ENCOUNTER — ANCILLARY PROCEDURE (OUTPATIENT)
Dept: GENERAL RADIOLOGY | Facility: CLINIC | Age: 40
End: 2020-07-22
Attending: INTERNAL MEDICINE
Payer: COMMERCIAL

## 2020-07-22 VITALS
DIASTOLIC BLOOD PRESSURE: 87 MMHG | WEIGHT: 208 LBS | HEIGHT: 73 IN | SYSTOLIC BLOOD PRESSURE: 131 MMHG | BODY MASS INDEX: 27.57 KG/M2

## 2020-07-22 VITALS
HEIGHT: 73 IN | SYSTOLIC BLOOD PRESSURE: 131 MMHG | OXYGEN SATURATION: 99 % | TEMPERATURE: 97.3 F | HEART RATE: 52 BPM | WEIGHT: 208.1 LBS | DIASTOLIC BLOOD PRESSURE: 87 MMHG | BODY MASS INDEX: 27.58 KG/M2

## 2020-07-22 DIAGNOSIS — B07.9 VIRAL WARTS, UNSPECIFIED TYPE: ICD-10-CM

## 2020-07-22 DIAGNOSIS — S46.001A INJURY OF RIGHT ROTATOR CUFF, INITIAL ENCOUNTER: ICD-10-CM

## 2020-07-22 DIAGNOSIS — M25.511 ACUTE PAIN OF RIGHT SHOULDER: ICD-10-CM

## 2020-07-22 DIAGNOSIS — M75.41 INTERNAL IMPINGEMENT OF RIGHT SHOULDER: Primary | ICD-10-CM

## 2020-07-22 DIAGNOSIS — M25.511 ACUTE PAIN OF RIGHT SHOULDER: Primary | ICD-10-CM

## 2020-07-22 DIAGNOSIS — M24.211 LIGAMENTOUS LAXITY OF RIGHT SHOULDER: ICD-10-CM

## 2020-07-22 PROCEDURE — 73030 X-RAY EXAM OF SHOULDER: CPT | Mod: RT

## 2020-07-22 PROCEDURE — 99214 OFFICE O/P EST MOD 30 MIN: CPT | Performed by: INTERNAL MEDICINE

## 2020-07-22 PROCEDURE — 99204 OFFICE O/P NEW MOD 45 MIN: CPT | Performed by: FAMILY MEDICINE

## 2020-07-22 ASSESSMENT — MIFFLIN-ST. JEOR
SCORE: 1912.36
SCORE: 1912.82

## 2020-07-22 NOTE — PROGRESS NOTES
"[facility]   Clinic Visit s Jul 22, 2020    PCP: Nuvia Moore    ASSESSMENT/PLAN    ICD-10-CM    1. Internal impingement of right shoulder  M75.41 [facility]   2. Ligamentous laxity of right shoulder  M24.211 [facility]        Posterior shoulder pain consistent with internal impingement and associated laxity, notable also for GIRD.  Reassurance, no sign of rotator cuff tear, no indication for MRI at this time unless he fails to improve with physical therapy.  Referred for physical therapy, recheck 6 weeks if not improved.          Today's Visit:  Marcos is a 39 year old male who is seen in consultation at the request of Dr. Moore for   Chief Complaint   Patient presents with     Right Shoulder - Pain       Injury: Patient believes it is a combo of a wrestling match and wakesurfing - arm yanked forward then he pulled back.     Right hand dominant      Location of Pain: right shoulder posterior, pain goes up into his neck   Duration of Pain: acute, 3 week(s),   Rating of Pain at worst: 5/10  Rating of Pain Currently: 0/10  Pain is better with: rest   Pain is worse with: horizontal adduction, throwing a ball  Treatment so far consists of: no treatment tried to date  Associated symptoms: no distal numbness or tingling; denies swelling or warmth  Recent imaging completed: X-rays completed 7/22/20.  Prior History of related problems: \"popped out\" before but never required attention.    Social History:       Past Medical History:   Diagnosis Date     Testicular torsion        Family History   Problem Relation Age of Onset     Other Cancer Mother        Social History     Socioeconomic History     Marital status:      Spouse name: None     Number of children: None     Years of education: None     Highest education level: None   Occupational History     None   Social Needs     Financial resource strain: " "None     Food insecurity     Worry: None     Inability: None     Transportation needs     Medical: None     Non-medical: None   Tobacco Use     Smoking status: Never Smoker     Smokeless tobacco: Never Used   Substance and Sexual Activity     Alcohol use: Yes     Comment: 3 drinks aweek     Past Surgical History:   Procedure Laterality Date     CHOLECYSTECTOMY       EYE SURGERY      As child     GENITOURINARY SURGERY      testicular torsion     HERNIORRHAPHY UMBILICAL N/A 9/17/2018    Procedure: HERNIORRHAPHY UMBILICAL;;  Surgeon: Eliot Cobian MD;  Location: [facility]     LAPAROSCOPIC HERNIORRHAPHY INGUINAL BILATERAL Bilateral 9/17/2018    Procedure: LAPAROSCOPIC HERNIORRHAPHY INGUINAL BILATERAL;  LAPAROSCOPIC BILATERAL INGUINAL HERNIA REPAIRS WITH MESH, EXPLORATORY LAPAROSCOPY FOR PERITONEAL CYST REMOVAL, UMBILICAL HERNIA REPAIR;  Surgeon: Eliot Cobian MD;  Location: [facility]     LAPAROSCOPY DIAGNOSTIC (GENERAL) N/A 9/17/2018    Procedure: LAPAROSCOPY DIAGNOSTIC (GENERAL);;  Surgeon: Eliot Cobian MD;  Location: [facility]           Review of Systems   Musculoskeletal: Positive for joint pain.   All other systems reviewed and are negative.        Physical Exam  /87   Ht 1.854 m (6' 1\")   Wt 94.3 kg (208 lb)   BMI 27.44 kg/m    Constitutional:well-developed, well-nourished, and in no distress.   Cardiovascular: Intact distal pulses.    Neurological: alert. Gait Normal:   Gait, station, stance, and balance appear normal for age  Skin: Skin is warm and dry.   Psychiatric: Mood and affect normal.   Respiratory: unlabored, speaks in full sentences  Lymph: no LAD, no lymphangitis            Right Shoulder Exam     Tenderness   The patient is experiencing no tenderness.    Range of Motion   Active abduction: normal   Passive abduction: normal   Extension: normal   External rotation: normal   Forward flexion: normal   Internal rotation 0 degrees:  Lumbar abnormal   Internal rotation 90 degrees:  10 " abnormal     Muscle Strength   Abduction: 5/5   Internal rotation: 5/5   External rotation: 5/5   Supraspinatus: 5/5   Subscapularis: 5/5   Biceps: 5/5     Tests   Apprehension: positive  Sandhu test: negative  Cross arm: negative  Impingement: positive  Drop arm: negative  Sulcus: present    Other   Erythema: absent  Scars: absent  Sensation: normal  Pulse: present    Comments:  Mild anterior drawer laxity, no pain                X-ray images Previously done and independently reviewed by me in the office today with the patient. X-ray shows:   Recent Results (from the past 48 hour(s))   XR Shoulder Right G/E 3 Views    Narrative    RIGHT SHOULDER THREE OR MORE VIEWS   7/22/2020 10:56 AM     HISTORY:  Acute pain of right shoulder.      Impression    IMPRESSION: Unremarkable exam.

## 2020-07-22 NOTE — LETTER
"    7/22/2020         RE: Marcos Birmingham  [address] [place] [address]        Dear Colleague,    Thank you for referring your patient, Marcos Birmingham, to the  SPORTS MEDICINE. Please see a copy of my visit note below.    [facility]   Clinic Visit s Jul 22, 2020    PCP: Nuvia Moore    ASSESSMENT/PLAN    ICD-10-CM    1. Internal impingement of right shoulder  M75.41 [facility]   2. Ligamentous laxity of right shoulder  M24.211 [facility]        Posterior shoulder pain consistent with internal impingement and associated laxity, notable also for GIRD.  Reassurance, no sign of rotator cuff tear, no indication for MRI at this time unless he fails to improve with physical therapy.  Referred for physical therapy, recheck 6 weeks if not improved.          Today's Visit:  Marcos is a 39 year old male who is seen in consultation at the request of Dr. Moore for   Chief Complaint   Patient presents with     Right Shoulder - Pain       Injury: Patient believes it is a combo of a wrestling match and wakesurfing - arm yanked forward then he pulled back.     Right hand dominant      Location of Pain: right shoulder posterior, pain goes up into his neck   Duration of Pain: acute, 3 week(s),   Rating of Pain at worst: 5/10  Rating of Pain Currently: 0/10  Pain is better with: rest   Pain is worse with: horizontal adduction, throwing a ball  Treatment so far consists of: no treatment tried to date  Associated symptoms: no distal numbness or tingling; denies swelling or warmth  Recent imaging completed: X-rays completed 7/22/20.  Prior History of related problems: \"popped out\" before but never required attention.    Social History:       Past Medical History:   Diagnosis Date     Testicular torsion        Family History   Problem Relation Age of Onset     Other Cancer Mother        Social History " "    Socioeconomic History     Marital status:      Spouse name: None     Number of children: None     Years of education: None     Highest education level: None   Occupational History     None   Social Needs     Financial resource strain: None     Food insecurity     Worry: None     Inability: None     Transportation needs     Medical: None     Non-medical: None   Tobacco Use     Smoking status: Never Smoker     Smokeless tobacco: Never Used   Substance and Sexual Activity     Alcohol use: Yes     Comment: 3 drinks aweek     Past Surgical History:   Procedure Laterality Date     CHOLECYSTECTOMY       EYE SURGERY      As child     GENITOURINARY SURGERY      testicular torsion     HERNIORRHAPHY UMBILICAL N/A 9/17/2018    Procedure: HERNIORRHAPHY UMBILICAL;;  Surgeon: Eliot Cobian MD;  Location: [facility]     LAPAROSCOPIC HERNIORRHAPHY INGUINAL BILATERAL Bilateral 9/17/2018    Procedure: LAPAROSCOPIC HERNIORRHAPHY INGUINAL BILATERAL;  LAPAROSCOPIC BILATERAL INGUINAL HERNIA REPAIRS WITH MESH, EXPLORATORY LAPAROSCOPY FOR PERITONEAL CYST REMOVAL, UMBILICAL HERNIA REPAIR;  Surgeon: Eliot Cobian MD;  Location: [facility]     LAPAROSCOPY DIAGNOSTIC (GENERAL) N/A 9/17/2018    Procedure: LAPAROSCOPY DIAGNOSTIC (GENERAL);;  Surgeon: Eliot Cobian MD;  Location: [facility]           Review of Systems   Musculoskeletal: Positive for joint pain.   All other systems reviewed and are negative.        Physical Exam  /87   Ht 1.854 m (6' 1\")   Wt 94.3 kg (208 lb)   BMI 27.44 kg/m    Constitutional:well-developed, well-nourished, and in no distress.   Cardiovascular: Intact distal pulses.    Neurological: alert. Gait Normal:   Gait, station, stance, and balance appear normal for age  Skin: Skin is warm and dry.   Psychiatric: Mood and affect normal.   Respiratory: unlabored, speaks in full sentences  Lymph: no LAD, no lymphangitis            Right Shoulder Exam     Tenderness   The patient is experiencing no " tenderness.    Range of Motion   Active abduction: normal   Passive abduction: normal   Extension: normal   External rotation: normal   Forward flexion: normal   Internal rotation 0 degrees:  Lumbar abnormal   Internal rotation 90 degrees:  10 abnormal     Muscle Strength   Abduction: 5/5   Internal rotation: 5/5   External rotation: 5/5   Supraspinatus: 5/5   Subscapularis: 5/5   Biceps: 5/5     Tests   Apprehension: positive  Sandhu test: negative  Cross arm: negative  Impingement: positive  Drop arm: negative  Sulcus: present    Other   Erythema: absent  Scars: absent  Sensation: normal  Pulse: present    Comments:  Mild anterior drawer laxity, no pain                X-ray images Previously done and independently reviewed by me in the office today with the patient. X-ray shows:   Recent Results (from the past 48 hour(s))   XR Shoulder Right G/E 3 Views    Narrative    RIGHT SHOULDER THREE OR MORE VIEWS   7/22/2020 10:56 AM     HISTORY:  Acute pain of right shoulder.      Impression    IMPRESSION: Unremarkable exam.       Again, thank you for allowing me to participate in the care of your patient.        Sincerely,        Yogesh Good MD

## 2020-07-22 NOTE — PROGRESS NOTES
Chief Complaint:   Marcos Birmingham is a 39 year old male who presents to clinic today for the following health issues:    Right shoulder injury about 2 weeks ago.     HPI:     Shoulder Pain    Duration:     Since: 2 weeks ago           Specific cause: sports injury from water skiing    Description:      Location of pain: right shoulder     Character of pain: sharp     Pain radiation: none    Intensity: moderate    History:      Pain interferes with job: No     History of similar pain problems: No     Any previous MRI or X-rays: No     Therapies tried without relief: OTC pain medications    Alleviating factors:      Improved by: none    Precipitating factors:    Worsened by: range of motion movements at the right shoulder joint    Accompanying Signs & Symptoms: none            Current Medications:     Current Outpatient Medications   Medication Sig Dispense Refill     Lidocaine (LIDOCARE) 4 % Patch Place 1 patch onto the skin every 24 hours 30 patch 3         Allergies:      Allergies   Allergen Reactions     No Known Allergies             Past Medical History:     Past Medical History:   Diagnosis Date     Testicular torsion          Past Surgical History:     Past Surgical History:   Procedure Laterality Date     CHOLECYSTECTOMY       EYE SURGERY      As child     GENITOURINARY SURGERY      testicular torsion     HERNIORRHAPHY UMBILICAL N/A 9/17/2018    Procedure: HERNIORRHAPHY UMBILICAL;;  Surgeon: Eliot Cobian MD;  Location: [facility]     LAPAROSCOPIC HERNIORRHAPHY INGUINAL BILATERAL Bilateral 9/17/2018    Procedure: LAPAROSCOPIC HERNIORRHAPHY INGUINAL BILATERAL;  LAPAROSCOPIC BILATERAL INGUINAL HERNIA REPAIRS WITH MESH, EXPLORATORY LAPAROSCOPY FOR PERITONEAL CYST REMOVAL, UMBILICAL HERNIA REPAIR;  Surgeon: Eliot Cobian MD;  Location: [facility]     LAPAROSCOPY DIAGNOSTIC (GENERAL) N/A 9/17/2018    Procedure: LAPAROSCOPY DIAGNOSTIC (GENERAL);;  Surgeon: Eliot Cobian MD;  Location: [facility]          Family Medical History:     Family History   Problem Relation Age of Onset     Other Cancer Mother          Social History:     Social History     Socioeconomic History     Marital status:      Spouse name: Not on file     Number of children: Not on file     Years of education: Not on file     Highest education level: Not on file   Occupational History     Not on file   Social Needs     Financial resource strain: Not on file     Food insecurity     Worry: Not on file     Inability: Not on file     Transportation needs     Medical: Not on file     Non-medical: Not on file   Tobacco Use     Smoking status: Never Smoker     Smokeless tobacco: Never Used   Substance and Sexual Activity     Alcohol use: Yes     Comment: 3 drinks aweek     Drug use: No     Sexual activity: Yes     Partners: Female   Lifestyle     Physical activity     Days per week: Not on file     Minutes per session: Not on file     Stress: Not on file   Relationships     Social connections     Talks on phone: Not on file     Gets together: Not on file     Attends Church service: Not on file     Active member of club or organization: Not on file     Attends meetings of clubs or organizations: Not on file     Relationship status: Not on file     Intimate partner violence     Fear of current or ex partner: Not on file     Emotionally abused: Not on file     Physically abused: Not on file     Forced sexual activity: Not on file   Other Topics Concern     Parent/sibling w/ CABG, MI or angioplasty before 65F 55M? No   Social History Narrative     Not on file           Review of System:     Constitutional: Negative for fever or chills  Skin: Negative for rashes  Ears/Nose/Throat: Negative for nasal congestion, sore throat  Respiratory: No shortness of breath, dyspnea on exertion, cough, or hemoptysis  Cardiovascular: Negative for chest pain  Gastrointestinal: Negative for nausea, vomiting  Genitourinary: Negative for dysuria,  "hematuria  Musculoskeletal: positive for mechanical right shoulder pains  Neurologic: Negative for headaches  Psychiatric: Negative for depression, anxiety  Hematologic/Lymphatic/Immunologic: Negative  Endocrine: Negative  Behavioral: Negative for tobacco use       Physical Exam:   /87 (BP Location: Right arm, Patient Position: Sitting, Cuff Size: Adult Regular)   Pulse 52   Temp 97.3  F (36.3  C) (Tympanic)   Ht 1.854 m (6' 1\")   Wt 94.4 kg (208 lb 1.6 oz)   SpO2 99%   BMI 27.46 kg/m      GENERAL: alert and no distress  EYES: eyes grossly normal to inspection, and conjunctivae and sclerae normal  HENT: Normocephalic atraumatic. Nose and mouth without ulcers or lesions  NECK: supple  RESP: lungs clear to auscultation   CV: regular rate and rhythm, normal S1 S2  LYMPH: no peripheral edema   ABDOMEN: nondistended  MS: pain with range of motion movements noted at the right shoulder  SKIN: no suspicious lesions or rashes  NEURO: Alert & Oriented x 3.   PSYCH: mentation appears normal, affect normal        Diagnostic Test Results:     Diagnostic Test Results:  Results for orders placed or performed in visit on 07/22/20   XR Shoulder Right G/E 3 Views     Status: None    Narrative    RIGHT SHOULDER THREE OR MORE VIEWS   7/22/2020 10:56 AM     HISTORY:  Acute pain of right shoulder.      Impression    IMPRESSION: Unremarkable exam.    TRISTA ALLEN MD       ASSESSMENT/PLAN:       (S46.001A) Injury of right rotator cuff, initial encounter  (M25.511) Acute pain of right shoulder  (primary encounter diagnosis)  Comment: sports injury related right shoulder pains concerning for acte rotator cuff injury  Plan: XR Shoulder Right G/E 3 Views, Orthopedic &         Spine  Referral, Lidocaine (LIDOCARE)         4 % Patch           (B07.9) Viral warts, unspecified type  Comment: resolved. Patient is no longer utilizing the Aldara topical medication  Plan: I have discontinued the patient's previous Aldara medication " due to non-use.        Follow Up Plan:     Patient is instructed to return to Internal Medicine clinic for follow-up visit in 1 month.        Nuvia Moore MD  Internal Medicine  [facility]

## 2020-07-23 RX ORDER — LIDOCAINE 4 G/G
1 PATCH TOPICAL EVERY 24 HOURS
Qty: 30 PATCH | Refills: 3 | Status: SHIPPED | OUTPATIENT
Start: 2020-07-23 | End: 2023-02-09

## 2021-01-15 ENCOUNTER — HEALTH MAINTENANCE LETTER (OUTPATIENT)
Age: 41
End: 2021-01-15

## 2021-08-04 ENCOUNTER — OFFICE VISIT (OUTPATIENT)
Dept: FAMILY MEDICINE | Facility: CLINIC | Age: 41
End: 2021-08-04
Payer: COMMERCIAL

## 2021-08-04 VITALS
BODY MASS INDEX: 26.44 KG/M2 | SYSTOLIC BLOOD PRESSURE: 130 MMHG | OXYGEN SATURATION: 96 % | WEIGHT: 206 LBS | RESPIRATION RATE: 16 BRPM | TEMPERATURE: 97.2 F | HEART RATE: 50 BPM | DIASTOLIC BLOOD PRESSURE: 90 MMHG | HEIGHT: 74 IN

## 2021-08-04 DIAGNOSIS — H65.02 NON-RECURRENT ACUTE SEROUS OTITIS MEDIA OF LEFT EAR: Primary | ICD-10-CM

## 2021-08-04 PROCEDURE — 99213 OFFICE O/P EST LOW 20 MIN: CPT | Performed by: FAMILY MEDICINE

## 2021-08-04 RX ORDER — AMOXICILLIN 500 MG/1
500 CAPSULE ORAL 2 TIMES DAILY
Qty: 20 CAPSULE | Refills: 0 | Status: SHIPPED | OUTPATIENT
Start: 2021-08-04 | End: 2021-08-14

## 2021-08-04 ASSESSMENT — MIFFLIN-ST. JEOR: SCORE: 1914.16

## 2021-08-04 NOTE — PROGRESS NOTES
"Assessment & Plan     Non-recurrent acute serous otitis media of left ear  Assessment: Clinical examination today is consistent with an acute left otitis media.  Single palpable lymph node in the right submandibular area.  Size of the lymph node is 1 cm.  The patient has an upcoming appointment with his primary care per his provider for an annual physical.  Advised him to get a CBC done.    Plan:  - amoxicillin (AMOXIL) 500 MG capsule; Take 1 capsule (500 mg) by mouth 2 times daily for 10 days  -Patient was advised to return to clinic if symptoms fail to improve.    Return in about 2 weeks (around 8/18/2021) for Follow-up visit-  if symptoms fails to improve.    Lizy Barrios MD  [facility] is a 40 year old who presents for the following health issues:    HPI     Concern - ear pain   Onset: 2-3 weeks   Description: left ear pain   Intensity: moderate  Progression of Symptoms:  same and intermittent  Accompanying Signs & Symptoms: tenderness on a single lymph nodes on right side   Previous history of similar problem: no  Precipitating factors:        Worsened by:  Nothing - pressure and discomfort are constant.   Alleviating factors:        Improved by: nothing  Therapies tried and outcome: pt was seen at [facility] clinic and was told ear was clear did a strep test that was negative   He also report sinus pressure. Denies any nasal drainage. Denies any fevers or chills.     Review of Systems   Constitutional, HEENT, cardiovascular, pulmonary, gi and gu systems are negative, except as otherwise noted.      Objective    BP (!) 130/90   Pulse 50   Temp 97.2  F (36.2  C) (Tympanic)   Resp 16   Ht 1.88 m (6' 2\")   Wt 93.4 kg (206 lb)   SpO2 96%   BMI 26.45 kg/m    Body mass index is 26.45 kg/m .  Physical Exam   GENERAL: healthy, alert and no distress  HENT: erythematous bulging TM on the left with mucoid fluid effusion. Right TM shows clear fluid effusion. No bulging " of the TM. Mild pharyngeal erythema. A single palpable lymphnode on the right submandibular area. Size is 1 cm.   RESP: lungs clear to auscultation - no rales, rhonchi or wheezes  CV: regular rate and rhythm, normal S1 S2, no S3 or S4, no murmur, click or rub, no peripheral edema and peripheral pulses strong

## 2021-08-04 NOTE — PATIENT INSTRUCTIONS
Patient Education     Middle Ear Infection (Otitis Media) in Adults  What is a middle ear infection?  A middle ear infection occurs behind the eardrum. It is most often caused by a virus or bacteria. Most kids have at least one middle ear infection by the time they are 3 years old. But adults can also get them.   What causes middle ear infections?  Inflammation in the middle ear most often starts after you ve had a sore throat, cold, or other upper respiratory problem. The infection spreads to the middle ear and causes fluid buildup behind the eardrum.    What are the symptoms of a middle ear infection?  These are the most common symptoms of middle ear infections in adults:     Ear pain    Feeling of fullness in the ear    Fluid draining from the ear    Fever    Hearing loss  These symptoms may look like other conditions or health problems. Always talk with your healthcare provider for a diagnosis.   How is a middle ear infection diagnosed?  Your healthcare provider will review your health history and do a physical exam. They will check the outer ear and the eardrum using an otoscope. This is a lighted tool that lets the healthcare provider see inside the ear. A pneumatic otoscope blows a puff of air into the ear to test eardrum movement. When there is fluid or infection in the middle ear, movement is decreased.   Your provider may also do a tympanometry. This is a test that directs air and sound to the middle ear.   If you have ear infections often, your healthcare provider may suggest having a hearing test.   How is a middle ear infection treated?  Treatment will depend on your symptoms, age, and general health. It will also depend on how severe the condition is.   Treatment may include:    Antibiotics    Pain relievers    Placing small tubes in the eardrum for chronic ear infections   What are possible complications of a middle ear infection?   Untreated ear infections can lead to:    Infection in other parts  of the head    Lasting (permanent) hearing loss    Speech and language problems  Can middle ear infections be prevented?  Cold and allergy medicines don't seem to prevent ear infections. And currently there is no vaccine that can prevent the disease. But check with your healthcare provider and make sure your vaccines are up-to-date. Living in a home where cigarettes are smoked can increase the chances of ear infections. So can living in a home where vaping devices, such as e-cigarettes and electronic nicotine, are used   Key points about middle ear infections    Middle ear infections can affect both children and adults.    Pain and fever can be the most common symptoms.    Without treatment, permanent hearing loss may happen.    Take antibiotics as prescribed and finish all of the prescription. This can help prevent antibiotic-resistant infections or incomplete treatment with the infection returning.    Keeping your home smoke-free or free of vaping devices can decrease the chances of ear infections.    Next steps  Tips to help you get the most from a visit to your healthcare provider:    Know the reason for your visit and what you want to happen.    Before your visit, write down questions you want answered.    Bring someone with you to help you ask questions and remember what your provider tells you.    At the visit, write down the name of a new diagnosis, and any new medicines, treatments, or tests. Also write down any new instructions your provider gives you.    Know why a new medicine or treatment is prescribed, and how it will help you. Also know what the side effects are.    Ask if your condition can be treated in other ways.    Know why a test or procedure is recommended and what the results could mean.    Know what to expect if you do not take the medicine or have the test or procedure.    If you have a follow-up appointment, write down the date, time, and purpose for that visit.    Know how you can contact  your provider if you have questions.  Cody last reviewed this educational content on 1/1/2021 2000-2021 [employer]. All rights reserved. This information is not intended as a substitute for professional medical care. Always follow your healthcare professional's instructions.

## 2021-08-18 ENCOUNTER — TELEPHONE (OUTPATIENT)
Dept: FAMILY MEDICINE | Facility: CLINIC | Age: 41
End: 2021-08-18

## 2021-08-18 NOTE — TELEPHONE ENCOUNTER
Reason for call:  Patient reporting a symptom    Symptom or request: Ear infection is not going away after taking medication    Duration (how long have symptoms been present): 10 days    Have you been treated for this before? Yes    Additional comments: Medication is now gone, please call to discuss. Thank you. Patient will use [facility] in [place] phone number is [phone]    Phone Number patient can be reached at:  Home number on file [phone] (home)    Best Time:  any    Can we leave a detailed message on this number:  YES    Call taken on 8/18/2021 at 10:54 AM by Jen Fabian

## 2021-08-18 NOTE — TELEPHONE ENCOUNTER
FS    RN called patient. Seen 8/04/2021and given Amoxicillin- helped a little bit, but now worsening. Patient now feels more pressure on right ear too-- in both ears. Afebrile. Pended Pharmacy    Do you want to see patient for follow-up or trial a different Abx?    SHANE Smith, RN   [facility]

## 2021-08-19 ENCOUNTER — OFFICE VISIT (OUTPATIENT)
Dept: FAMILY MEDICINE | Facility: CLINIC | Age: 41
End: 2021-08-19
Payer: COMMERCIAL

## 2021-08-19 VITALS
HEART RATE: 60 BPM | OXYGEN SATURATION: 98 % | TEMPERATURE: 96.9 F | WEIGHT: 210 LBS | SYSTOLIC BLOOD PRESSURE: 115 MMHG | BODY MASS INDEX: 25.57 KG/M2 | RESPIRATION RATE: 22 BRPM | DIASTOLIC BLOOD PRESSURE: 79 MMHG | HEIGHT: 76 IN

## 2021-08-19 DIAGNOSIS — H66.005 RECURRENT ACUTE SUPPURATIVE OTITIS MEDIA WITHOUT SPONTANEOUS RUPTURE OF LEFT TYMPANIC MEMBRANE: Primary | ICD-10-CM

## 2021-08-19 PROCEDURE — 99213 OFFICE O/P EST LOW 20 MIN: CPT | Performed by: FAMILY MEDICINE

## 2021-08-19 RX ORDER — CEFDINIR 300 MG/1
300 CAPSULE ORAL 2 TIMES DAILY
Qty: 20 CAPSULE | Refills: 0 | Status: SHIPPED | OUTPATIENT
Start: 2021-08-19 | End: 2021-08-29

## 2021-08-19 ASSESSMENT — MIFFLIN-ST. JEOR: SCORE: 1956.11

## 2021-08-19 NOTE — PROGRESS NOTES
"Assessment & Plan     Recurrent acute suppurative otitis media without spontaneous rupture of left tympanic membrane  Persistent fluid effusion with mild improvement with first course of antibiotics. He was given Cefdinir today, advised him to see ENT for evaluation.     - cefdinir (OMNICEF) 300 MG capsule; Take 1 capsule (300 mg) by mouth 2 times daily for 10 days  - Otolaryngology Referral; Future    Return in about 1 week (around 8/26/2021) for see ENT if symptoms failed to improve. .    Lizy Barrios MD  [facility]    Shannon SOLARES is a 40 year old who presents for the following health issues     HPI     Concern - Ear Infection   Onset: ongoing 1.5 months, last OV 8/4/21  Description: left ear pain, now right ear also   Intensity: mild  Progression of Symptoms:  slightly worse  Accompanying Signs & Symptoms: sinus pressure  Previous history of similar problem: ongoing  Precipitating factors: [place] - maybe started with water in ears       Worsened by: none  Alleviating factors:        Improved by: none  Therapies tried and outcome: amoxicillin - not helpful  Headache when he lays on his ear.   Pressure on the right ear.   Pressure sensation in the jaw area as well.   Denies any drainage  No hearing changes.   Itching in both ears.       Review of Systems   Constitutional, HEENT, cardiovascular, pulmonary, gi and gu systems are negative, except as otherwise noted.      Objective    /79 (Patient Position: Sitting, Cuff Size: Adult Large)   Pulse 60   Temp 96.9  F (36.1  C)   Resp 22   Ht 1.918 m (6' 3.5\")   Wt 95.3 kg (210 lb)   SpO2 98%   BMI 25.90 kg/m    Body mass index is 25.9 kg/m .  Physical Exam   GENERAL: healthy, alert and no distress  HENT: clear fluid effusion behind both TM.   RESP: lungs clear to auscultation - no rales, rhonchi or wheezes  CV: regular rate and rhythm, normal S1 S2, no S3 or S4, no murmur, click or rub, no peripheral " edema and peripheral pulses strong  MS: no gross musculoskeletal defects noted, no edema    No results found for any visits on 08/19/21.

## 2021-10-24 ENCOUNTER — HEALTH MAINTENANCE LETTER (OUTPATIENT)
Age: 41
End: 2021-10-24

## 2022-02-13 ENCOUNTER — HEALTH MAINTENANCE LETTER (OUTPATIENT)
Age: 42
End: 2022-02-13

## 2022-09-03 NOTE — MR AVS SNAPSHOT
After Visit Summary   9/13/2018    Marcos Birmingham    MRN: [id number]           Patient Information     Date Of Birth          1980        Visit Information        Provider Department      9/13/2018 3:20 PM Nuvia Moore MD [facility]        Today's Diagnoses     Preop general physical exam    -  1    Bilateral recurrent inguinal hernia without obstruction or gangrene        Peritoneal cyst          Care Instructions      Before Your Surgery      Call your surgeon if there is any change in your health. This includes signs of a cold or flu (such as a sore throat, runny nose, cough, rash or fever).    Do not smoke, drink alcohol or take over the counter medicine (unless your surgeon or primary care doctor tells you to) for the 24 hours before and after surgery.    If you take prescribed drugs: Follow your doctor s orders about which medicines to take and which to stop until after surgery.    Eating and drinking prior to surgery: follow the instructions from your surgeon    Take a shower or bath the night before surgery. Use the soap your surgeon gave you to gently clean your skin. If you do not have soap from your surgeon, use your regular soap. Do not shave or scrub the surgery site.  Wear clean pajamas and have clean sheets on your bed.           Follow-ups after your visit        Your next 10 appointments already scheduled     Sep 17, 2018   Procedure with Eliot Cobian MD   [facility] (--)    [address]  [facility] [address]   [phone]            Sep 17, 2018  8:15 AM CDT   [facility] Same Day Surgery with Eliot Cobian MD   [facility] ([facility])    [facility] ([facility])   [phone]              Who to contact     If you have questions or need follow up information about today's clinic visit or your schedule please  "contact [facility] directly at [phone].  Normal or non-critical lab and imaging results will be communicated to you by MyChart, letter or phone within 4 business days after the clinic has received the results. If you do not hear from us within 7 days, please contact the clinic through MyChart or phone. If you have a critical or abnormal lab result, we will notify you by phone as soon as possible.  Submit refill requests through [employer] or call your pharmacy and they will forward the refill request to us. Please allow 3 business days for your refill to be completed.          Additional Information About Your Visit        [employer] Information     [employer] lets you send messages to your doctor, view your test results, renew your prescriptions, schedule appointments and more. To sign up, go to [email or web address] . Click on \"Log in\" on the left side of the screen, which will take you to the Welcome page. Then click on \"Sign up Now\" on the right side of the page.     You will be asked to enter the access code listed below, as well as some personal information. Please follow the directions to create your username and password.     Your access code is: [email or web address]  Expires: 2018  3:08 PM     Your access code will  in 90 days. If you need help or a new code, please call your [place] clinic or [phone].        Care EveryWhere ID     This is your Care EveryWhere ID. This could be used by other organizations to access your [place] medical records  [id number]        Your Vitals Were     Pulse Temperature Height Pulse Oximetry BMI (Body Mass Index)       56 98.3  F (36.8  C) (Oral) 6' 1\" (1.854 m) 96% 27.57 kg/m2        Blood Pressure from Last 3 Encounters:   18 114/74   18 110/76   18 113/71    Weight from Last 3 Encounters:   18 209 lb (94.8 kg)   18 203 lb (92.1 kg)   18 203 lb (92.1 kg)              We Performed the Following     EKG 12-lead " complete w/read - Clinics        Primary Care Provider Office Phone # Fax #    Nuvia Eliot Moore [phone] [address] [address]        Equal Access to Services     YUDITH KILLIAN : Hadii tomas ku hadsadafo Soomaali, waaxda luqadaha, qaybta kaalmada adeegyada, waxralph carlos betyn sun garciaraudelyaakov patrick. So [facility] [phone].    ATENCIÓN: Si habla español, tiene a stewart disposición servicios gratuitos de asistencia lingüística. Llame al [phone].    We comply with applicable federal civil rights laws and [place] laws. We do not discriminate on the basis of race, color, national origin, age, disability, sex, sexual orientation, or gender identity.            Thank you!     Thank you for choosing [facility]  for your care. Our goal is always to provide you with excellent care. Hearing back from our patients is one way we can continue to improve our services. Please take a few minutes to complete the written survey that you may receive in the mail after your visit with us. Thank you!             Your Updated Medication List - Protect others around you: Learn how to safely use, store and throw away your medicines at www.disposemymeds.org.      Notice  As of 9/13/2018  3:48 PM    You have not been prescribed any medications.       19.4

## 2022-10-04 ENCOUNTER — TELEPHONE (OUTPATIENT)
Dept: FAMILY MEDICINE | Facility: CLINIC | Age: 42
End: 2022-10-04

## 2022-10-04 NOTE — TELEPHONE ENCOUNTER
Reason for Call:  Appointment Request    Patient requesting this type of appt:  Stomach     Requested provider: Nuvia Moore    Reason patient unable to be scheduled: Not within requested timeframe    When does patient want to be seen/preferred time: 1-2 days    Comments: Stomach pain no solid bowels for about a month would like to be seen ASAP with any provider.    Could we send this information to you in [facility] or would you prefer to receive a phone call?:   Patient would prefer a phone call   Okay to leave a detailed message?: Yes at Home number on file [phone] (home)    Call taken on 10/4/2022 at 5:14 PM by Raven Yañez

## 2022-10-05 ENCOUNTER — VIRTUAL VISIT (OUTPATIENT)
Dept: FAMILY MEDICINE | Facility: CLINIC | Age: 42
End: 2022-10-05
Payer: COMMERCIAL

## 2022-10-05 ENCOUNTER — LAB (OUTPATIENT)
Dept: LAB | Facility: CLINIC | Age: 42
End: 2022-10-05
Payer: COMMERCIAL

## 2022-10-05 DIAGNOSIS — R19.7 DIARRHEA OF PRESUMED INFECTIOUS ORIGIN: ICD-10-CM

## 2022-10-05 DIAGNOSIS — R19.7 DIARRHEA OF PRESUMED INFECTIOUS ORIGIN: Primary | ICD-10-CM

## 2022-10-05 LAB
ALBUMIN SERPL-MCNC: 3.7 G/DL (ref 3.4–5)
ALP SERPL-CCNC: 60 U/L (ref 40–150)
ALT SERPL W P-5'-P-CCNC: 28 U/L (ref 0–70)
ANION GAP SERPL CALCULATED.3IONS-SCNC: 6 MMOL/L (ref 3–14)
AST SERPL W P-5'-P-CCNC: 23 U/L (ref 0–45)
BASOPHILS # BLD AUTO: 0 10E3/UL (ref 0–0.2)
BASOPHILS NFR BLD AUTO: 0 %
BILIRUB SERPL-MCNC: 0.6 MG/DL (ref 0.2–1.3)
BUN SERPL-MCNC: 18 MG/DL (ref 7–30)
CALCIUM SERPL-MCNC: 9 MG/DL (ref 8.5–10.1)
CHLORIDE BLD-SCNC: 108 MMOL/L (ref 94–109)
CO2 SERPL-SCNC: 25 MMOL/L (ref 20–32)
CREAT SERPL-MCNC: 1.2 MG/DL (ref 0.66–1.25)
EOSINOPHIL # BLD AUTO: 0.2 10E3/UL (ref 0–0.7)
EOSINOPHIL NFR BLD AUTO: 3 %
ERYTHROCYTE [DISTWIDTH] IN BLOOD BY AUTOMATED COUNT: 12.5 % (ref 10–15)
GFR SERPL CREATININE-BSD FRML MDRD: 77 ML/MIN/1.73M2
GLUCOSE BLD-MCNC: 100 MG/DL (ref 70–99)
HCT VFR BLD AUTO: 41.5 % (ref 40–53)
HGB BLD-MCNC: 14.3 G/DL (ref 13.3–17.7)
LYMPHOCYTES # BLD AUTO: 1.8 10E3/UL (ref 0.8–5.3)
LYMPHOCYTES NFR BLD AUTO: 40 %
MCH RBC QN AUTO: 30 PG (ref 26.5–33)
MCHC RBC AUTO-ENTMCNC: 34.5 G/DL (ref 31.5–36.5)
MCV RBC AUTO: 87 FL (ref 78–100)
MONOCYTES # BLD AUTO: 0.4 10E3/UL (ref 0–1.3)
MONOCYTES NFR BLD AUTO: 10 %
NEUTROPHILS # BLD AUTO: 2.1 10E3/UL (ref 1.6–8.3)
NEUTROPHILS NFR BLD AUTO: 47 %
PLATELET # BLD AUTO: 174 10E3/UL (ref 150–450)
POTASSIUM BLD-SCNC: 3.9 MMOL/L (ref 3.4–5.3)
PROT SERPL-MCNC: 7.1 G/DL (ref 6.8–8.8)
RBC # BLD AUTO: 4.77 10E6/UL (ref 4.4–5.9)
SODIUM SERPL-SCNC: 139 MMOL/L (ref 133–144)
WBC # BLD AUTO: 4.5 10E3/UL (ref 4–11)

## 2022-10-05 PROCEDURE — 85025 COMPLETE CBC W/AUTO DIFF WBC: CPT

## 2022-10-05 PROCEDURE — 36415 COLL VENOUS BLD VENIPUNCTURE: CPT

## 2022-10-05 PROCEDURE — 99214 OFFICE O/P EST MOD 30 MIN: CPT | Mod: 95 | Performed by: NURSE PRACTITIONER

## 2022-10-05 PROCEDURE — 80053 COMPREHEN METABOLIC PANEL: CPT

## 2022-10-05 ASSESSMENT — ENCOUNTER SYMPTOMS: DIARRHEA: 1

## 2022-10-05 NOTE — PROGRESS NOTES
BECK is a 42 year old who is being evaluated via a billable video visit.      How would you like to obtain your AVS? MyChart  If the video visit is dropped, the invitation should be resent by: Text to cell phone: [phone]  Will anyone else be joining your video visit? No      Assessment & Plan     Diarrhea of presumed infectious origin  Unknown etiology, discussed suspicion for infectious cause given onset with raw meat intake.   Will plan for stool and blood testing.   Discussed supportive therapies.   If testing is negative, plan for colonoscopy.   Discussed red flags and need for urgent follow-up.    - Clostridium difficile Toxin B PCR  - Helicobacter pylori Antigen Stool  - Ova and Parasite Exam Routine  - Enteric Bacteria and Virus Panel by HARMAN Stool  - Cryptosporidium/Giardia Immunoassay  - CBC with platelets and differential  - Comprehensive metabolic panel (BMP + Alb, Alk Phos, ALT, AST, Total. Bili, TP)  - Fecal Lactoferrin                   No follow-ups on file.    DEBBIE [facility]   BECK is a 42 year old, presenting for the following health issues:  Diarrhea      Diarrhea    History of Present Illness       Reason for visit:  No solid bowel movements  Symptom onset:  More than a month  Symptoms include:  Diarrhea, stomach pain  Symptom intensity:  Moderate  Symptom progression:  Staying the same  Had these symptoms before:  No  What makes it worse:  Eating  What makes it better:  No    He eats 0-1 servings of fruits and vegetables daily.He consumes 0 sweetened beverage(s) daily.He exercises with enough effort to increase his heart rate 20 to 29 minutes per day.  He exercises with enough effort to increase his heart rate 4 days per week.   He is taking medications regularly.     Diarrhea  Onset/Duration: 1 month  Description:       Consistency of stool: watery, loose and brown       Blood in stool: No       Number of loose stools past  "24 hours: approximately 3.   Progression of Symptoms: improving   Accompanying signs and symptoms:       Fever: No       Nausea/Vomiting: No       Abdominal pain: YES- resolved now just with bloat.        Weight loss: No       Episodes of constipation: No  History     Ate at a [employer] and has not felt right since  Ill contacts: No  Recent use of antibiotics: No  Recent travels: No  Recent medication-new or changes(Rx or OTC): No  Precipitating or alleviating factors: None  Therapies tried and outcome: none    Started after a meal of seafood, steak, vegetables. Some foods were raw.     Review of Systems   Gastrointestinal: Positive for diarrhea.            Objective    Vitals - Patient Reported  Weight (Patient Reported): 93.9 kg (207 lb)  Height (Patient Reported): 191.8 cm (6' 3.5\")  BMI (Based on Pt Reported Ht/Wt): 25.53    Physical Exam   GENERAL: Healthy, alert and no distress  EYES: Eyes grossly normal to inspection.  No discharge or erythema, or obvious scleral/conjunctival abnormalities.  RESP: No audible wheeze, cough, or visible cyanosis.  No visible retractions or increased work of breathing.    SKIN: Visible skin clear. No significant rash, abnormal pigmentation or lesions.  NEURO: Cranial nerves grossly intact.  Mentation and speech appropriate for age.  PSYCH: Mentation appears normal, affect normal/bright, judgement and insight intact, normal speech and appearance well-groomed.                Video-Visit Details    Video Start Time: 0955    Type of service:  Video Visit    Video End Time:10:03 AM    Originating Location (pt. Location): Home    Distant Location (provider location):  [facility]     Platform used for Video Visit: Sarah"

## 2022-10-05 NOTE — TELEPHONE ENCOUNTER
Per chart review, patient seen today 10/5/22 and discussed symptoms.     Signing encounter.     Kylee Meza RN  [facility]

## 2022-10-06 ENCOUNTER — DOCUMENTATION ONLY (OUTPATIENT)
Dept: LAB | Facility: CLINIC | Age: 42
End: 2022-10-06

## 2022-10-06 LAB
C COLI+JEJUNI+LARI FUSA STL QL NAA+PROBE: NOT DETECTED
C DIFF TOX B STL QL: NEGATIVE
EC STX1 GENE STL QL NAA+PROBE: NOT DETECTED
EC STX2 GENE STL QL NAA+PROBE: NOT DETECTED
NOROV GI+II ORF1-ORF2 JNC STL QL NAA+PR: NOT DETECTED
RVA NSP5 STL QL NAA+PROBE: NOT DETECTED
SALMONELLA SP RPOD STL QL NAA+PROBE: NOT DETECTED
SHIGELLA SP+EIEC IPAH STL QL NAA+PROBE: NOT DETECTED
V CHOL+PARA RFBL+TRKH+TNAA STL QL NAA+PR: NOT DETECTED
Y ENTERO RECN STL QL NAA+PROBE: NOT DETECTED

## 2022-10-06 PROCEDURE — 87328 CRYPTOSPORIDIUM AG IA: CPT

## 2022-10-06 PROCEDURE — 87493 C DIFF AMPLIFIED PROBE: CPT | Mod: 59

## 2022-10-06 PROCEDURE — 87209 SMEAR COMPLEX STAIN: CPT

## 2022-10-06 PROCEDURE — 87329 GIARDIA AG IA: CPT | Mod: 59

## 2022-10-06 PROCEDURE — 87177 OVA AND PARASITES SMEARS: CPT

## 2022-10-06 PROCEDURE — 87506 IADNA-DNA/RNA PROBE TQ 6-11: CPT

## 2022-10-06 PROCEDURE — 87338 HPYLORI STOOL AG IA: CPT

## 2022-10-07 DIAGNOSIS — R19.7 DIARRHEA OF PRESUMED INFECTIOUS ORIGIN: Primary | ICD-10-CM

## 2022-10-07 LAB
C PARVUM AG STL QL IA: NEGATIVE
G LAMBLIA AG STL QL IA: NEGATIVE
H PYLORI AG STL QL IA: NEGATIVE
O+P STL MICRO: NEGATIVE

## 2022-10-11 ENCOUNTER — HOSPITAL ENCOUNTER (OUTPATIENT)
Facility: CLINIC | Age: 42
End: 2022-10-11
Attending: COLON & RECTAL SURGERY | Admitting: COLON & RECTAL SURGERY
Payer: COMMERCIAL

## 2022-10-11 ENCOUNTER — MYC MEDICAL ADVICE (OUTPATIENT)
Dept: FAMILY MEDICINE | Facility: CLINIC | Age: 42
End: 2022-10-11

## 2022-10-11 ENCOUNTER — TELEPHONE (OUTPATIENT)
Dept: GASTROENTEROLOGY | Facility: CLINIC | Age: 42
End: 2022-10-11

## 2022-10-11 DIAGNOSIS — Z12.11 ENCOUNTER FOR SCREENING COLONOSCOPY: Primary | ICD-10-CM

## 2022-10-11 NOTE — TELEPHONE ENCOUNTER
Screening Questions  BLUE  KIND OF PREP RED  LOCATION [review exclusion criteria] GREEN  SEDATION TYPE        Y Are you active on mychart?     Delmi Walton, DEBBIE CNP    Ordering/Referring Provider?        PREF ONE What type of coverage do you have?      N Have you had a positive covid test in the last 90 days?     26.2 1. BMI  [BMI 40+ - review exclusion criteria]    Y  2. Are you able to give consent for your medical care? [IF NO,RN REVIEW]        N  3. Are you taking any prescription pain medications on a routine schedule?      NA  3a. EXTENDED PREP What kind of prescription?     N 4. Do you have any chemical dependencies such as alcohol, street drugs, or methadone?    N 5. Do you have any history of post-traumatic stress syndrome, severe anxiety or history of psychosis?      **If yes 3- 5 , please schedule with MAC sedation.**          IF YES TO ANY 6 - 10 - HOSPITAL SETTING ONLY.     N 6.   Do you need assistance transferring?     N 7.   Have you had a heart or lung transplant?    N 8.   Are you currently on dialysis?   N 9.   Do you use daily home oxygen?   N 10. Do you take nitroglycerin?   10a. NA If yes, how often?     11. [FEMALES]  NA Are you currently pregnant?    11a. NA If yes, how many weeks? [ Greater than 12 weeks, OR NEEDED]    N 12. Do you have Pulmonary Hypertension? *NEED PAC APPT AT [facility]*     N 13. [review exclusion criteria]  Do you have any implantable devices in your body (pacemaker, defib, LVAD)?    N 14. In the past 6 months, have you had any heart related issues including cardiomyopathy or heart attack?     14a. NA If yes, did it require cardiac stenting if so when?     N 15. Have you had a stroke or Transient ischemic attack (TIA - aka  mini stroke ) within 6 months?      N 16. Do you have mod to severe Obstructive Sleep Apnea?  [Hospital only - Ok at [place]]    N 17. Do you have SEVERE AND UNCONTROLLED asthma? *NEED PAC APPT AT [facility]*     N 18. Are you currently taking any blood  "thinners?     18a. If yes, inform patient to \"follow up w/ ordering provider for bridging instructions.\"    N 19. Do you take the medication Phentermine?    19a. If yes, \"Hold for 7 days before procedure.  Please consult your prescribing provider if you have questions about holding this medication.\"     N  20. Do you have chronic kidney disease?      N  21. Do you have a diagnosis of diabetes?     N  22. On a regular basis do you go 3-5 days between bowel movements?      23. Preferred LOCAL Pharmacy for Pre Prescription    [ LIST ONLY ONE PHARMACY]        [id number] - [place], [place] - [address]    - CLOSING REMINDERS -    Informed patient they will need an adult    Cannot take any type of public or medical transportation alone    Conscious Sedation- Needs  for 6 hours after the procedure       MAC/General-Needs  for 24 hours after procedure    Pre-Procedure Covid test to be completed [[facility] PCR Testing Required]    Confirmed Nurse will call to complete assessment       - SCHEDULING DETAILS -     ENOC  Surgeon    10/20  Date of Procedure  Lower Endoscopy [Colonoscopy]  Type of Procedure Scheduled   [facility] PREP-If you answer yes to questions #8, #20, #21Which Colonoscopy Prep was Sent?     MODERATE Sedation Type     N PAC / Pre-op Required         Additional comments:            "

## 2022-10-15 ENCOUNTER — HEALTH MAINTENANCE LETTER (OUTPATIENT)
Age: 42
End: 2022-10-15

## 2022-10-19 ENCOUNTER — MYC MEDICAL ADVICE (OUTPATIENT)
Dept: FAMILY MEDICINE | Facility: CLINIC | Age: 42
End: 2022-10-19

## 2022-10-19 ENCOUNTER — OFFICE VISIT (OUTPATIENT)
Dept: URGENT CARE | Facility: URGENT CARE | Age: 42
End: 2022-10-19
Payer: COMMERCIAL

## 2022-10-19 VITALS
OXYGEN SATURATION: 98 % | WEIGHT: 218 LBS | DIASTOLIC BLOOD PRESSURE: 87 MMHG | BODY MASS INDEX: 26.89 KG/M2 | HEART RATE: 52 BPM | SYSTOLIC BLOOD PRESSURE: 133 MMHG | TEMPERATURE: 97.9 F

## 2022-10-19 DIAGNOSIS — K21.00 GASTROESOPHAGEAL REFLUX DISEASE WITH ESOPHAGITIS WITHOUT HEMORRHAGE: ICD-10-CM

## 2022-10-19 DIAGNOSIS — J02.9 SORETHROAT: Primary | ICD-10-CM

## 2022-10-19 DIAGNOSIS — R19.7 DIARRHEA OF PRESUMED INFECTIOUS ORIGIN: Primary | ICD-10-CM

## 2022-10-19 LAB — DEPRECATED S PYO AG THROAT QL EIA: NEGATIVE

## 2022-10-19 PROCEDURE — 99214 OFFICE O/P EST MOD 30 MIN: CPT

## 2022-10-19 PROCEDURE — 87651 STREP A DNA AMP PROBE: CPT

## 2022-10-19 RX ORDER — LANSOPRAZOLE 30 MG/1
30 CAPSULE, DELAYED RELEASE ORAL DAILY
Qty: 30 CAPSULE | Refills: 0 | Status: SHIPPED | OUTPATIENT
Start: 2022-10-19 | End: 2023-02-09

## 2022-10-19 NOTE — TELEPHONE ENCOUNTER
Please fax referral to [facility].    Heriberto Nolen PA-C on 10/19/2022 at 4:21 PM

## 2022-10-20 ENCOUNTER — TELEPHONE (OUTPATIENT)
Dept: GASTROENTEROLOGY | Facility: CLINIC | Age: 42
End: 2022-10-20

## 2022-10-20 LAB — GROUP A STREP BY PCR: NOT DETECTED

## 2022-10-20 NOTE — TELEPHONE ENCOUNTER
Caller: Marcos Birmingham      Procedure: Colon    Date, Location, and Surgeon of Procedure Cancelled: 10/20 Theron HUBBARD    Ordering Provider:Heriberto Nolen PA-C    Reason for cancel (please be detailed, any staff messages or encounters to note?): Reschedule         Rescheduled: Y     If rescheduled:    Date: 11/2   Location:    Prep Resent: Yes (changes to prep?)   Covid Test Rescheduled: no   Note any change or update to original order/sedation:

## 2022-10-20 NOTE — PROGRESS NOTES
(S) Marcos Birmingham is a 42 year old male with complaint of gastrointestinal symptoms of diarrhea, indigestion and sore throat for 21 days. He has had diarrhea for several months and all labs and stool cultures were negative.  He is scheduled for a colonoscopy.  He has not tried any medicines for his indigestion and knows foods affect it. No blood in stool. He is not having chest pressure right now.  Last meal was lunch 1/2 burger and soup    (O) Physical exam reveals the patient appears well.   /87   Pulse 52   Temp 97.9  F (36.6  C) (Tympanic)   Wt 98.9 kg (218 lb)   SpO2 98%   BMI 26.89 kg/m      ears clear throat mild erythema neck supple well hydrated. Abdomen: moderate tenderness in the epigastric area, no rebound tenderness, no guarding or rigidity.      Results for orders placed or performed in visit on 10/19/22   Streptococcus A Rapid Screen w/Reflex to PCR - Clinic Collect     Status: Normal    Specimen: Throat; Swab   Result Value Ref Range    Group A Strep antigen Negative Negative       (A)    Diagnosis Comments   1. Sorethroat  Streptococcus A Rapid Screen w/Reflex to PCR - Clinic Collect       2. Gastroesophageal reflux disease with esophagitis without hemorrhage  LANsoprazole (PREVACID) 30 MG DR capsule           (P)     Strep was negative  He will start prevacid daily and discuss with PCP or GI  Handout given

## 2022-11-02 ENCOUNTER — HOSPITAL ENCOUNTER (OUTPATIENT)
Facility: CLINIC | Age: 42
Discharge: HOME | End: 2022-11-02
Attending: COLON & RECTAL SURGERY | Admitting: COLON & RECTAL SURGERY
Payer: COMMERCIAL

## 2022-11-02 VITALS
HEART RATE: 53 BPM | RESPIRATION RATE: 16 BRPM | HEIGHT: 74 IN | DIASTOLIC BLOOD PRESSURE: 89 MMHG | OXYGEN SATURATION: 94 % | BODY MASS INDEX: 26.82 KG/M2 | WEIGHT: 209 LBS | SYSTOLIC BLOOD PRESSURE: 124 MMHG

## 2022-11-02 LAB — COLONOSCOPY: NORMAL

## 2022-11-02 PROCEDURE — G0500 MOD SEDAT ENDO SERVICE >5YRS: HCPCS | Performed by: COLON & RECTAL SURGERY

## 2022-11-02 PROCEDURE — 88305 TISSUE EXAM BY PATHOLOGIST: CPT | Mod: 26

## 2022-11-02 PROCEDURE — 45380 COLONOSCOPY AND BIOPSY: CPT | Performed by: COLON & RECTAL SURGERY

## 2022-11-02 PROCEDURE — 250N000011 HC RX IP 250 OP 636: Performed by: COLON & RECTAL SURGERY

## 2022-11-02 PROCEDURE — 88305 TISSUE EXAM BY PATHOLOGIST: CPT | Mod: TC | Performed by: COLON & RECTAL SURGERY

## 2022-11-02 RX ORDER — ONDANSETRON 2 MG/ML
4 INJECTION INTRAMUSCULAR; INTRAVENOUS
Status: DISCONTINUED | OUTPATIENT
Start: 2022-11-02 | End: 2022-11-02 | Stop reason: HOSPADM

## 2022-11-02 RX ORDER — FENTANYL CITRATE 50 UG/ML
INJECTION, SOLUTION INTRAMUSCULAR; INTRAVENOUS PRN
Status: DISCONTINUED | OUTPATIENT
Start: 2022-11-02 | End: 2022-11-02 | Stop reason: HOSPADM

## 2022-11-02 RX ORDER — LIDOCAINE 40 MG/G
CREAM TOPICAL
Status: DISCONTINUED | OUTPATIENT
Start: 2022-11-02 | End: 2022-11-02 | Stop reason: HOSPADM

## 2022-11-02 ASSESSMENT — ACTIVITIES OF DAILY LIVING (ADL): ADLS_ACUITY_SCORE: 35

## 2022-11-02 NOTE — H&P
Colon & Rectal Surgery History and Physical  Pre-Endoscopy Procedure Note    History of Present Illness   I have been asked by Dr. Moore to evaluate this 42 year old male for unexplained diarrhea. He currently otherwise denies any abdominal pain, weight loss, or bleeding per rectum.    Past Medical History  Diagnosis Date     Testicular torsion        Past Surgical History  Procedure Laterality Date     CHOLECYSTECTOMY       EYE SURGERY      As child     GENITOURINARY SURGERY      testicular torsion     HERNIORRHAPHY UMBILICAL N/A 9/17/2018    Procedure: HERNIORRHAPHY UMBILICAL;;  Surgeon: Eliot Cobian MD;  Location: [facility]     LAPAROSCOPIC HERNIORRHAPHY INGUINAL BILATERAL Bilateral 9/17/2018    Procedure: LAPAROSCOPIC HERNIORRHAPHY INGUINAL BILATERAL;  LAPAROSCOPIC BILATERAL INGUINAL HERNIA REPAIRS WITH MESH, EXPLORATORY LAPAROSCOPY FOR PERITONEAL CYST REMOVAL, UMBILICAL HERNIA REPAIR;  Surgeon: Eliot Cobian MD;  Location: [facility]     LAPAROSCOPY DIAGNOSTIC (GENERAL) N/A 9/17/2018    Procedure: LAPAROSCOPY DIAGNOSTIC (GENERAL);;  Surgeon: Eliot Cobian MD;  Location: [facility]        Medications  Medication Sig     LANsoprazole (PREVACID) 30 MG DR capsule Take 1 capsule (30 mg) by mouth daily     Lidocaine (LIDOCARE) 4 % Patch Place 1 patch onto the skin every 24 hours       Allergies  Allergen Reactions     No Known Allergies         Family History   Family history includes Other Cancer in his mother.     Social History   He reports that he has never smoked. He has never used smokeless tobacco. He reports current alcohol use. He reports that he does not use drugs.    Review of Systems   Constitutional:  No fever, weight change or fatigue.    Eyes:     No dry eyes or vision changes.   Ears/Nose/Throat/Neck:  No oral ulcers, sore throat or voice change.    Cardiovascular:   No palpitations, syncope, angina or edema.   Respiratory:    No chest pain, excessive sleepiness, shortness of breath or  "hemoptysis.    Gastrointestinal:   No abdominal pain, nausea, vomiting, diarrhea or heartburn.    Genitourinary:   No dysuria, hematuria, urinary retention or urinary frequency.   Musculoskeletal:  No joint swelling or arthralgias.    Dermatologic:  No skin rash or other skin changes.   Neurologic:    No focal weakness or numbness. No neuropathy.   Psychiatric:    No depression, anxiety, suicidal ideation, or paranoid ideation.   Endocrine:   No cold or heat intolerance, polydipsia, hirsutism, change in libido, or flushing.   Hematology/Lymphatic:  No bleeding or lymphadenopathy.    Allergy/Immunology:  No rhinitis or hives.     Physical Exam   Vitals:  Blood pressure 135/91, resp. rate 14, height 1.88 m (6' 2\"), weight 94.8 kg (209 lb), SpO2 96 %.    General:  Alert and oriented to person, place and time   Airway: Normal oropharyngeal airway and neck mobility   Lungs:  Clear bilaterally   Heart:  Regular rate and rhythm   Abdomen: Soft, NT, ND, no masses   Extremities: Warm, good capillary refill    ASA Grade: II (mild systemic disease)    Impression: Cleared for use of conscious sedation for evaluation of diarrhea    Plan: Proceed with colonoscopy     Cynthia Crump MD  [facility]  [phone]  "

## 2022-11-07 LAB
PATH REPORT.COMMENTS IMP SPEC: NORMAL
PATH REPORT.FINAL DX SPEC: NORMAL
PATH REPORT.GROSS SPEC: NORMAL
PATH REPORT.MICROSCOPIC SPEC OTHER STN: NORMAL
PATH REPORT.RELEVANT HX SPEC: NORMAL
PHOTO IMAGE: NORMAL

## 2022-11-09 NOTE — RESULT ENCOUNTER NOTE
Biopsies normal and demonstrate no etiology for diarrhea.  Recommend screening colonoscopy in 10 years

## 2023-02-09 ENCOUNTER — OFFICE VISIT (OUTPATIENT)
Dept: FAMILY MEDICINE | Facility: CLINIC | Age: 43
End: 2023-02-09
Payer: COMMERCIAL

## 2023-02-09 VITALS
OXYGEN SATURATION: 98 % | SYSTOLIC BLOOD PRESSURE: 114 MMHG | TEMPERATURE: 97.6 F | BODY MASS INDEX: 27.72 KG/M2 | RESPIRATION RATE: 16 BRPM | HEIGHT: 74 IN | HEART RATE: 52 BPM | WEIGHT: 216 LBS | DIASTOLIC BLOOD PRESSURE: 80 MMHG

## 2023-02-09 DIAGNOSIS — Z23 NEED FOR PROPHYLACTIC VACCINATION AND INOCULATION AGAINST INFLUENZA: ICD-10-CM

## 2023-02-09 DIAGNOSIS — Z53.20 SCREENING FOR HEPATITIS C DECLINED: ICD-10-CM

## 2023-02-09 DIAGNOSIS — Z13.0 SCREENING, ANEMIA, DEFICIENCY, IRON: ICD-10-CM

## 2023-02-09 DIAGNOSIS — Z53.20 HIV SCREENING DECLINED: ICD-10-CM

## 2023-02-09 DIAGNOSIS — Z00.00 ROUTINE HISTORY AND PHYSICAL EXAMINATION OF ADULT: Primary | ICD-10-CM

## 2023-02-09 DIAGNOSIS — Z13.1 SCREENING FOR DIABETES MELLITUS: ICD-10-CM

## 2023-02-09 DIAGNOSIS — Z00.00 HEALTH CARE MAINTENANCE: ICD-10-CM

## 2023-02-09 DIAGNOSIS — Z23 NEED FOR COVID-19 VACCINE: ICD-10-CM

## 2023-02-09 DIAGNOSIS — Z13.220 ENCOUNTER FOR LIPID SCREENING FOR CARDIOVASCULAR DISEASE: ICD-10-CM

## 2023-02-09 DIAGNOSIS — Z13.6 ENCOUNTER FOR LIPID SCREENING FOR CARDIOVASCULAR DISEASE: ICD-10-CM

## 2023-02-09 DIAGNOSIS — Z71.89 ADVANCED DIRECTIVES, COUNSELING/DISCUSSION: ICD-10-CM

## 2023-02-09 LAB
ALBUMIN SERPL BCG-MCNC: 4.6 G/DL (ref 3.5–5.2)
ALP SERPL-CCNC: 69 U/L (ref 40–129)
ALT SERPL W P-5'-P-CCNC: 20 U/L (ref 10–50)
ANION GAP SERPL CALCULATED.3IONS-SCNC: 11 MMOL/L (ref 7–15)
AST SERPL W P-5'-P-CCNC: 33 U/L (ref 10–50)
BASOPHILS # BLD AUTO: 0 10E3/UL (ref 0–0.2)
BASOPHILS NFR BLD AUTO: 0 %
BILIRUB SERPL-MCNC: 0.5 MG/DL
BUN SERPL-MCNC: 18.1 MG/DL (ref 6–20)
CALCIUM SERPL-MCNC: 9.5 MG/DL (ref 8.6–10)
CHLORIDE SERPL-SCNC: 102 MMOL/L (ref 98–107)
CHOLEST SERPL-MCNC: 236 MG/DL
CREAT SERPL-MCNC: 1.17 MG/DL (ref 0.67–1.17)
DEPRECATED HCO3 PLAS-SCNC: 28 MMOL/L (ref 22–29)
EOSINOPHIL # BLD AUTO: 0.1 10E3/UL (ref 0–0.7)
EOSINOPHIL NFR BLD AUTO: 3 %
ERYTHROCYTE [DISTWIDTH] IN BLOOD BY AUTOMATED COUNT: 12.2 % (ref 10–15)
GFR SERPL CREATININE-BSD FRML MDRD: 80 ML/MIN/1.73M2
GLUCOSE SERPL-MCNC: 96 MG/DL (ref 70–99)
HBA1C MFR BLD: 5.4 % (ref 0–5.6)
HCT VFR BLD AUTO: 45.7 % (ref 40–53)
HDLC SERPL-MCNC: 48 MG/DL
HGB BLD-MCNC: 15.2 G/DL (ref 13.3–17.7)
IMM GRANULOCYTES # BLD: 0 10E3/UL
IMM GRANULOCYTES NFR BLD: 0 %
LDLC SERPL CALC-MCNC: 161 MG/DL
LYMPHOCYTES # BLD AUTO: 2.1 10E3/UL (ref 0.8–5.3)
LYMPHOCYTES NFR BLD AUTO: 44 %
MCH RBC QN AUTO: 28.7 PG (ref 26.5–33)
MCHC RBC AUTO-ENTMCNC: 33.3 G/DL (ref 31.5–36.5)
MCV RBC AUTO: 86 FL (ref 78–100)
MONOCYTES # BLD AUTO: 0.4 10E3/UL (ref 0–1.3)
MONOCYTES NFR BLD AUTO: 9 %
NEUTROPHILS # BLD AUTO: 2.2 10E3/UL (ref 1.6–8.3)
NEUTROPHILS NFR BLD AUTO: 45 %
NONHDLC SERPL-MCNC: 188 MG/DL
PLATELET # BLD AUTO: 198 10E3/UL (ref 150–450)
POTASSIUM SERPL-SCNC: 4 MMOL/L (ref 3.4–5.3)
PROT SERPL-MCNC: 7.5 G/DL (ref 6.4–8.3)
RBC # BLD AUTO: 5.3 10E6/UL (ref 4.4–5.9)
SODIUM SERPL-SCNC: 141 MMOL/L (ref 136–145)
TRIGL SERPL-MCNC: 133 MG/DL
WBC # BLD AUTO: 4.9 10E3/UL (ref 4–11)

## 2023-02-09 PROCEDURE — 99396 PREV VISIT EST AGE 40-64: CPT | Performed by: FAMILY MEDICINE

## 2023-02-09 PROCEDURE — 80053 COMPREHEN METABOLIC PANEL: CPT | Performed by: FAMILY MEDICINE

## 2023-02-09 PROCEDURE — 83036 HEMOGLOBIN GLYCOSYLATED A1C: CPT | Performed by: FAMILY MEDICINE

## 2023-02-09 PROCEDURE — 36415 COLL VENOUS BLD VENIPUNCTURE: CPT | Performed by: FAMILY MEDICINE

## 2023-02-09 PROCEDURE — 85025 COMPLETE CBC W/AUTO DIFF WBC: CPT | Performed by: FAMILY MEDICINE

## 2023-02-09 PROCEDURE — 80061 LIPID PANEL: CPT | Performed by: FAMILY MEDICINE

## 2023-02-09 ASSESSMENT — ENCOUNTER SYMPTOMS
SORE THROAT: 0
SHORTNESS OF BREATH: 0
FREQUENCY: 0
COUGH: 0
DIZZINESS: 0
HEADACHES: 0
DIARRHEA: 0
PARESTHESIAS: 0
NAUSEA: 0
HEARTBURN: 0
ABDOMINAL PAIN: 0
ARTHRALGIAS: 0
CONSTIPATION: 0
PALPITATIONS: 0
DYSURIA: 0
MYALGIAS: 0
HEMATURIA: 0
JOINT SWELLING: 0
NERVOUS/ANXIOUS: 0
CHILLS: 0
WEAKNESS: 0
EYE PAIN: 0
HEMATOCHEZIA: 0
FEVER: 0

## 2023-02-09 ASSESSMENT — PAIN SCALES - GENERAL: PAINLEVEL: NO PAIN (0)

## 2023-02-09 NOTE — PROGRESS NOTES
SUBJECTIVE:   CC: Marcos is an 42 year old who presents for preventative health visit.   Patient has been advised of split billing requirements and indicates understanding: Yes  Healthy Habits:     Getting at least 3 servings of Calcium per day:  Yes    Bi-annual eye exam:  NO    Dental care twice a year:  Yes    Sleep apnea or symptoms of sleep apnea:  None    Diet:  Other    Frequency of exercise:  4-5 days/week    Duration of exercise:  45-60 minutes    Taking medications regularly:  Yes    Medication side effects:  Other    PHQ-2 Total Score: 0    Additional concerns today:  No    New to me. 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  Diarrhea has since resolved.    Spot on back ? Larger , checks in the mirror.  Do not itch or burn, no known family history of skin cancer.  Not much family history is known, was adopted and later on came to know biological mother had non-Hodgkin's lymphoma noted on death certificate.    No  concerns  No new hernias    HM reviewed  No aCP on file, is planning to work on health directives    Colon 2022 normal  Neg biopsies  diarrhea resolved    No sig wt change, no symptoms, no need for TSH    Declines HIV, hep C testing   Ok with labs ordered today   Declines flu shot and Covid booster     Today's PHQ-2 Score:   PHQ-2 ( 1999 Pfizer) 2/9/2023   Q1: Little interest or pleasure in doing things 0   Q2: Feeling down, depressed or hopeless 0   PHQ-2 Score 0   PHQ-2 Total Score (12-17 Years)- Positive if 3 or more points; Administer PHQ-A if positive -    Q1: Little interest or pleasure in doing things Not at all   Q2: Feeling down, depressed or hopeless Not at all   PHQ-2 Score 0       Have you ever done Advance Care Planning? (For example, a Health Directive, POLST, or a discussion with a medical provider or your loved ones about your wishes): No, advance care planning information given to patient to review.  Patient plans to discuss their wishes with loved ones or provider.      Social History     Tobacco Use     Smoking status: Never     Smokeless tobacco: Never   Substance Use Topics     Alcohol use: Yes     Comment: 3 drinks aweek         Alcohol Use 2/9/2023   Prescreen: >3 drinks/day or >7 drinks/week? No   Prescreen: >3 drinks/day or >7 drinks/week? -       Last PSA: No results found for: PSA    Reviewed orders with patient. Reviewed health maintenance and updated orders accordingly - Yes  Lab work is in process  Labs reviewed in EPIC  BP Readings from Last 3 Encounters:   02/09/23 114/80   11/02/22 124/89   10/19/22 133/87    Wt Readings from Last 3 Encounters:   02/09/23 98 kg (216 lb)   11/02/22 94.8 kg (209 lb)   10/19/22 98.9 kg (218 lb)                  There is no problem list on file for this patient.    Past Surgical History:   Procedure Laterality Date     CHOLECYSTECTOMY  2008     COLONOSCOPY N/A 11/02/2022    Procedure: COLONOSCOPY, WITH POLYPECTOMY AND BIOPSY;  Surgeon: Cynthia Crump MD;  Location:  GI     EYE SURGERY      As child for a lazy eye     GENITOURINARY SURGERY  1997    testicular torsion     HERNIORRHAPHY UMBILICAL N/A 09/17/2018    Procedure: HERNIORRHAPHY UMBILICAL;;  Surgeon: Eliot Cobian MD;  Location: [facility]     LAPAROSCOPIC HERNIORRHAPHY INGUINAL BILATERAL Bilateral 09/17/2018    Procedure: LAPAROSCOPIC HERNIORRHAPHY INGUINAL BILATERAL;  LAPAROSCOPIC BILATERAL INGUINAL HERNIA REPAIRS WITH MESH, EXPLORATORY LAPAROSCOPY FOR PERITONEAL CYST REMOVAL, UMBILICAL HERNIA REPAIR;  Surgeon: Eliot Cobian MD;   Location: [facility]     LAPAROSCOPY DIAGNOSTIC (GENERAL) N/A 09/17/2018    Procedure: LAPAROSCOPY DIAGNOSTIC (GENERAL);;  Surgeon: Eliot Cobian MD;  Location: [facility]       Social History     Tobacco Use     Smoking status: Never     Smokeless tobacco: Never   Substance Use Topics     Alcohol use: Yes     Comment: 3 drinks aweek     Family History   Adopted: Yes   Problem Relation Age of Onset     Lymphoma Mother         note don death cert         No current outpatient medications on file.     Allergies   Allergen Reactions     No Known Allergies      Recent Labs   Lab Test 10/05/22  1038 05/16/19  1000   A1C  --  5.3   LDL  --  142*   HDL  --  53   TRIG  --  97   ALT 28  --    CR 1.20 1.17   GFRESTIMATED 77 78   GFRESTBLACK  --  >90   POTASSIUM 3.9 4.7        Reviewed and updated as needed this visit by clinical staff   Tobacco  Allergies  Meds  Problems  Med Hx  Surg Hx  Fam Hx  Soc   Hx        Reviewed and updated as needed this visit by Provider   Tobacco  Allergies  Meds  Problems  Med Hx  Surg Hx  Fam Hx  Soc   Hx       Past Medical History:   Diagnosis Date     Testicular torsion       Past Surgical History:   Procedure Laterality Date     CHOLECYSTECTOMY  2008     COLONOSCOPY N/A 11/02/2022    Procedure: COLONOSCOPY, WITH POLYPECTOMY AND BIOPSY;  Surgeon: Cynthia Crump MD;  Location:  GI     EYE SURGERY      As child for a lazy eye     GENITOURINARY SURGERY  1997    testicular torsion     HERNIORRHAPHY UMBILICAL N/A 09/17/2018    Procedure: HERNIORRHAPHY UMBILICAL;;  Surgeon: Eliot Cobian MD;  Location: [facility]     LAPAROSCOPIC HERNIORRHAPHY INGUINAL BILATERAL Bilateral 09/17/2018    Procedure: LAPAROSCOPIC HERNIORRHAPHY INGUINAL BILATERAL;  LAPAROSCOPIC BILATERAL INGUINAL HERNIA REPAIRS WITH MESH, EXPLORATORY LAPAROSCOPY FOR PERITONEAL CYST REMOVAL, UMBILICAL HERNIA REPAIR;  Surgeon: Eliot Cobian MD;  Location: [facility]     LAPAROSCOPY DIAGNOSTIC (GENERAL) N/A  "09/17/2018    Procedure: LAPAROSCOPY DIAGNOSTIC (GENERAL);;  Surgeon: Eliot Cobian MD;  Location: [facility]     OB History   No obstetric history on file.       Review of Systems   Constitutional: Negative for chills and fever.   HENT: Negative for congestion, ear pain, hearing loss and sore throat.    Eyes: Negative for pain and visual disturbance.   Respiratory: Negative for cough and shortness of breath.    Cardiovascular: Negative for chest pain, palpitations and peripheral edema.   Gastrointestinal: Negative for abdominal pain, constipation, diarrhea, heartburn, hematochezia and nausea.   Genitourinary: Negative for dysuria, frequency, genital sores, hematuria and urgency.   Musculoskeletal: Negative for arthralgias, joint swelling and myalgias.   Skin: Negative for rash.   Neurological: Negative for dizziness, weakness, headaches and paresthesias.   Psychiatric/Behavioral: Negative for mood changes. The patient is not nervous/anxious.      OBJECTIVE:   /80   Pulse 52   Temp 97.6  F (36.4  C) (Oral)   Resp 16   Ht 1.88 m (6' 2\")   Wt 98 kg (216 lb)   SpO2 98%   BMI 27.73 kg/m      Physical Exam  GENERAL: healthy, alert, no distress and over weight  EYES: Eyes grossly normal to inspection, PERRL and conjunctivae and sclerae normal  HENT: ear canals and TM's normal, nose and mouth without ulcers or lesions  NECK: no adenopathy, no asymmetry, masses, or scars and thyroid normal to palpation  RESP: lungs clear to auscultation - no rales, rhonchi or wheezes  CV: regular rate and rhythm, normal S1 S2, no S3 or S4, no murmur, click or rub, no peripheral edema and peripheral pulses strong  ABDOMEN: soft, nontender, no hepatosplenomegaly, no masses and bowel sounds normal   (male): normal male genitalia without lesions or urethral discharge, no hernia  MS: no gross musculoskeletal defects noted, no edema  SKIN: no suspicious lesions or rashes, few pigmented nevi lengthy jeans and moles on upper back " and left lower back that did not look suspicious  NEURO: Normal strength and tone, mentation intact and speech normal  PSYCH: mentation appears normal, affect normal/bright    Diagnostic Test Results:  Labs reviewed in Epic  No results found for this or any previous visit (from the past 24 hour(s)).  No results found for any visits on 02/09/23.    ASSESSMENT/PLAN:       ICD-10-CM    1. Routine history and physical examination of adult  Z00.00 CBC with platelets and differential     Comprehensive metabolic panel (BMP + Alb, Alk Phos, ALT, AST, Total. Bili, TP)     Lipid Profile (Chol, Trig, HDL, LDL calc)     Hemoglobin A1c      2. BMI 27.0-27.9,adult  Z68.27       3. Health care maintenance  Z00.00 REVIEW OF HEALTH MAINTENANCE PROTOCOL ORDERS      4. Advanced directives, counseling/discussion  Z71.89       5. Need for prophylactic vaccination and inoculation against influenza  Z23       6. Need for COVID-19 vaccine  Z23       7. HIV screening declined  Z53.20       8. Screening for hepatitis C declined  Z53.20       9. Screening, anemia, deficiency, iron  Z13.0 CBC with platelets and differential      10. Screening for diabetes mellitus  Z13.1 Comprehensive metabolic panel (BMP + Alb, Alk Phos, ALT, AST, Total. Bili, TP)     Hemoglobin A1c      11. Encounter for lipid screening for cardiovascular disease  Z13.220 Lipid Profile (Chol, Trig, HDL, LDL calc)    Z13.6         Seen for routine preventive physical today.   exam normal.  Continue with regular self testicular monthly checks.  Healthcare maintenance reviewed.  Consider working on healthcare directives.  Discussed vaccines, flu shot recommended yearly, COVID-vaccine booster due, currently opted not to get flu and COVID vaccines.  Colonoscopy done November 2022 for diarrhea that has since resolved, & biopsies were normal & exam was normal and recommended next screening colonoscopy due in 10 years which would be in 2032  BMI 27, discussed healthy eating &  "lifestyle and staying active.  Opted not to check hep C and HIV given risk is low.  Labs today done to screen for anemia, diabetes and cholesterol and will make further recommendations once those are in.  Return in 1 year for preventive physical and sooner on an office visit for any new concerns    Patient has been advised of split billing requirements and indicates understanding: Yes      COUNSELING:   Reviewed preventive health counseling, as reflected in patient instructions       Regular exercise       Healthy diet/nutrition       Vision screening       Immunizations    Declined: Covid-19 and Influenza due to Other not sure       Alcohol Use        Consider Hep C screening for all patients one time for ages 18-79 years       HIV screeninx in teen years, 1x in adult years, and at intervals if high risk       Colorectal cancer screening       The 10-year ASCVD risk score (Jesse BAIN, et al., 2019) is: 1.2%    Values used to calculate the score:      Age: 42 years      Sex: Male      Is Non- : No      Diabetic: No      Tobacco smoker: No      Systolic Blood Pressure: 114 mmHg      Is BP treated: No      HDL Cholesterol: 53 mg/dL      Total Cholesterol: 214 mg/dL       Advance Care Planning    BMI:   Estimated body mass index is 27.73 kg/m  as calculated from the following:    Height as of this encounter: 1.88 m (6' 2\").    Weight as of this encounter: 98 kg (216 lb).   Weight management plan: Discussed healthy diet and exercise guidelines    He reports that he has never smoked. He has never used smokeless tobacco.    Niru Crisostomo MD  [facility]  "

## 2023-02-09 NOTE — RESULT ENCOUNTER NOTE
Hello Mr. Birmingham,  Some of your results came back and are within acceptable limits. -Normal red blood cell (hgb) levels, normal white blood cell count and normal platelet levels.  -A1C (diabetic test) is normal and indicates that your blood sugar has been in a normal range the last 3 months.   If you have any further concerns please do not hesitate to contact us by message, phone or making an appointment.  Have a good day   Dr Kranthi MARTINEZ

## 2023-02-09 NOTE — RESULT ENCOUNTER NOTE
Hello Mr. Birmingham,  Some of your results came back showing  -LDL(bad) cholesterol level is elevated which can increase your heart disease risk.  A diet high in fat and simple carbohydrates, genetics and being overweight can contribute to this.   The 10-year ASCVD risk score (Jesse BAIN, et al., 2019) is: 1.7%    Values used to calculate the score:      Age: 42 years      Sex: Male      Is Non- : No      Diabetic: No      Tobacco smoker: No      Systolic Blood Pressure: 114 mmHg      Is BP treated: No      HDL Cholesterol: 48 mg/dL      Total Cholesterol: 236 mg/dL  Overall cardiovascular risk is low  ADVISE: exercising 150 minutes of aerobic exercise per week (30 minutes for 5 days per week or 50 minutes for 3 days per week are options) and eating a low saturated fat/low carbohydrate diet are helpful to improve this. In 12 months, you should recheck your fasting cholesterol panel   -Liver and gallbladder tests are normal (ALT,AST, Alk phos, bilirubin), kidney function is normal (Cr, GFR), sodium is normal, potassium is normal, calcium is normal, glucose is normal.  . If you have any further concerns please do not hesitate to contact us by message, phone or making an appointment.  Have a good day   Dr Kranthi MARTINEZ

## 2024-03-17 ENCOUNTER — HEALTH MAINTENANCE LETTER (OUTPATIENT)
Age: 44
End: 2024-03-17

## 2025-03-22 ENCOUNTER — HEALTH MAINTENANCE LETTER (OUTPATIENT)
Age: 45
End: 2025-03-22

## (undated) DEVICE — ENDO TROCAR DISSECTING BALLOON OMS-PDBS2

## (undated) DEVICE — SU VICRYL 3-0 SH 27" J316H

## (undated) DEVICE — ENDO TROCAR SLEEVE KII ADV FIXATION 05X75MM CFS03

## (undated) DEVICE — SOL WATER IRRIG 1000ML BOTTLE 2F7114

## (undated) DEVICE — BLADE CLIPPER 4406

## (undated) DEVICE — ENDO TROCAR FIRST ENTRY KII FIOS ADV FIX 05X75MM CFF05

## (undated) DEVICE — GLOVE PROTEXIS W/NEU-THERA 7.5  2D73TE75

## (undated) DEVICE — PACK LAP CHOLE SLC15LCFSD

## (undated) DEVICE — LINEN TOWEL PACK X5 5464

## (undated) DEVICE — SU PDS II 0 ENDOLOOP EZ10G

## (undated) DEVICE — SUCTION CANISTER MEDIVAC LINER 3000ML W/LID 65651-530

## (undated) DEVICE — SU VICRYL 0 UR-6 27" J603H

## (undated) DEVICE — PREP CHLORAPREP 26ML TINTED ORANGE  260815

## (undated) DEVICE — SU MONOCRYL 4-0 PS-2 18" UND Y496G

## (undated) DEVICE — ENDO TROCAR BLUNT TIP KII BALLOON 12X100MM C0R47

## (undated) DEVICE — ESU PENCIL W/SMOKE EVAC NEPTUNE STRYKER 0703-046-000

## (undated) DEVICE — SU ETHIBOND 0 CT-2 30" X412H

## (undated) DEVICE — SOL NACL 0.9% IRRIG 1000ML BOTTLE 07138-09

## (undated) DEVICE — GLOVE GAMMEX DERMAPRENE ULTRA SZ 8 LF 8516

## (undated) RX ORDER — FENTANYL CITRATE 50 UG/ML
INJECTION, SOLUTION INTRAMUSCULAR; INTRAVENOUS
Status: DISPENSED
Start: 2022-11-02

## (undated) RX ORDER — HYDROMORPHONE HYDROCHLORIDE 1 MG/ML
INJECTION, SOLUTION INTRAMUSCULAR; INTRAVENOUS; SUBCUTANEOUS
Status: DISPENSED
Start: 2018-09-17

## (undated) RX ORDER — PROPOFOL 10 MG/ML
INJECTION, EMULSION INTRAVENOUS
Status: DISPENSED
Start: 2018-09-17

## (undated) RX ORDER — FENTANYL CITRATE 50 UG/ML
INJECTION, SOLUTION INTRAMUSCULAR; INTRAVENOUS
Status: DISPENSED
Start: 2018-09-17

## (undated) RX ORDER — CEFAZOLIN SODIUM 2 G/100ML
INJECTION, SOLUTION INTRAVENOUS
Status: DISPENSED
Start: 2018-09-17

## (undated) RX ORDER — LIDOCAINE HYDROCHLORIDE 20 MG/ML
INJECTION, SOLUTION EPIDURAL; INFILTRATION; INTRACAUDAL; PERINEURAL
Status: DISPENSED
Start: 2018-09-17

## (undated) RX ORDER — NEOSTIGMINE METHYLSULFATE 1 MG/ML
VIAL (ML) INJECTION
Status: DISPENSED
Start: 2018-09-17

## (undated) RX ORDER — OXYCODONE HYDROCHLORIDE 5 MG/1
TABLET ORAL
Status: DISPENSED
Start: 2018-09-17

## (undated) RX ORDER — GLYCOPYRROLATE 0.2 MG/ML
INJECTION, SOLUTION INTRAMUSCULAR; INTRAVENOUS
Status: DISPENSED
Start: 2018-09-17

## (undated) RX ORDER — ONDANSETRON 2 MG/ML
INJECTION INTRAMUSCULAR; INTRAVENOUS
Status: DISPENSED
Start: 2018-09-17

## (undated) RX ORDER — KETOROLAC TROMETHAMINE 30 MG/ML
INJECTION, SOLUTION INTRAMUSCULAR; INTRAVENOUS
Status: DISPENSED
Start: 2018-09-17

## (undated) RX ORDER — ACETAMINOPHEN 325 MG/1
TABLET ORAL
Status: DISPENSED
Start: 2018-09-17

## (undated) RX ORDER — DEXAMETHASONE SODIUM PHOSPHATE 4 MG/ML
INJECTION, SOLUTION INTRA-ARTICULAR; INTRALESIONAL; INTRAMUSCULAR; INTRAVENOUS; SOFT TISSUE
Status: DISPENSED
Start: 2018-09-17